# Patient Record
Sex: FEMALE | Race: WHITE | Employment: PART TIME | URBAN - METROPOLITAN AREA
[De-identification: names, ages, dates, MRNs, and addresses within clinical notes are randomized per-mention and may not be internally consistent; named-entity substitution may affect disease eponyms.]

---

## 2018-06-18 ENCOUNTER — OFFICE VISIT (OUTPATIENT)
Dept: FAMILY MEDICINE CLINIC | Age: 18
End: 2018-06-18
Payer: COMMERCIAL

## 2018-06-18 VITALS
TEMPERATURE: 98.4 F | WEIGHT: 109 LBS | HEART RATE: 66 BPM | OXYGEN SATURATION: 98 % | HEIGHT: 64 IN | SYSTOLIC BLOOD PRESSURE: 112 MMHG | DIASTOLIC BLOOD PRESSURE: 70 MMHG | BODY MASS INDEX: 18.61 KG/M2

## 2018-06-18 DIAGNOSIS — Z13.31 POSITIVE DEPRESSION SCREENING: ICD-10-CM

## 2018-06-18 DIAGNOSIS — F32.1 MODERATE MAJOR DEPRESSION (HCC): ICD-10-CM

## 2018-06-18 DIAGNOSIS — Z00.00 ANNUAL PHYSICAL EXAM: Primary | ICD-10-CM

## 2018-06-18 PROCEDURE — G8420 CALC BMI NORM PARAMETERS: HCPCS | Performed by: NURSE PRACTITIONER

## 2018-06-18 PROCEDURE — G8431 POS CLIN DEPRES SCRN F/U DOC: HCPCS | Performed by: NURSE PRACTITIONER

## 2018-06-18 PROCEDURE — 1036F TOBACCO NON-USER: CPT | Performed by: NURSE PRACTITIONER

## 2018-06-18 PROCEDURE — G8427 DOCREV CUR MEDS BY ELIG CLIN: HCPCS | Performed by: NURSE PRACTITIONER

## 2018-06-18 PROCEDURE — 99385 PREV VISIT NEW AGE 18-39: CPT | Performed by: NURSE PRACTITIONER

## 2018-06-18 RX ORDER — ESCITALOPRAM OXALATE 10 MG/1
10 TABLET ORAL DAILY
Qty: 30 TABLET | Refills: 0 | Status: SHIPPED | OUTPATIENT
Start: 2018-06-18 | End: 2018-08-31

## 2018-06-18 RX ORDER — IBUPROFEN 400 MG/1
400 TABLET ORAL EVERY 6 HOURS PRN
COMMUNITY
End: 2018-07-16 | Stop reason: DRUGHIGH

## 2018-06-18 ASSESSMENT — PATIENT HEALTH QUESTIONNAIRE - PHQ9
10. IF YOU CHECKED OFF ANY PROBLEMS, HOW DIFFICULT HAVE THESE PROBLEMS MADE IT FOR YOU TO DO YOUR WORK, TAKE CARE OF THINGS AT HOME, OR GET ALONG WITH OTHER PEOPLE: 1
SUM OF ALL RESPONSES TO PHQ9 QUESTIONS 1 & 2: 6
1. LITTLE INTEREST OR PLEASURE IN DOING THINGS: 3
7. TROUBLE CONCENTRATING ON THINGS, SUCH AS READING THE NEWSPAPER OR WATCHING TELEVISION: 3
6. FEELING BAD ABOUT YOURSELF - OR THAT YOU ARE A FAILURE OR HAVE LET YOURSELF OR YOUR FAMILY DOWN: 0
3. TROUBLE FALLING OR STAYING ASLEEP: 3
5. POOR APPETITE OR OVEREATING: 3
8. MOVING OR SPEAKING SO SLOWLY THAT OTHER PEOPLE COULD HAVE NOTICED. OR THE OPPOSITE, BEING SO FIGETY OR RESTLESS THAT YOU HAVE BEEN MOVING AROUND A LOT MORE THAN USUAL: 3
SUM OF ALL RESPONSES TO PHQ QUESTIONS 1-9: 21
4. FEELING TIRED OR HAVING LITTLE ENERGY: 3
9. THOUGHTS THAT YOU WOULD BE BETTER OFF DEAD, OR OF HURTING YOURSELF: 0
2. FEELING DOWN, DEPRESSED OR HOPELESS: 3

## 2018-06-18 ASSESSMENT — ENCOUNTER SYMPTOMS
CONSTIPATION: 0
COUGH: 0
WHEEZING: 0
DOUBLE VISION: 0
SHORTNESS OF BREATH: 0
DIARRHEA: 0
VOMITING: 0
BLURRED VISION: 0
NAUSEA: 0

## 2018-07-16 ENCOUNTER — OFFICE VISIT (OUTPATIENT)
Dept: FAMILY MEDICINE CLINIC | Age: 18
End: 2018-07-16
Payer: COMMERCIAL

## 2018-07-16 VITALS
TEMPERATURE: 98.2 F | SYSTOLIC BLOOD PRESSURE: 114 MMHG | WEIGHT: 108.5 LBS | BODY MASS INDEX: 18.52 KG/M2 | DIASTOLIC BLOOD PRESSURE: 60 MMHG | HEART RATE: 75 BPM | HEIGHT: 64 IN

## 2018-07-16 DIAGNOSIS — M54.50 CHRONIC MIDLINE LOW BACK PAIN WITHOUT SCIATICA: ICD-10-CM

## 2018-07-16 DIAGNOSIS — S16.1XXA STRAIN OF NECK MUSCLE, INITIAL ENCOUNTER: ICD-10-CM

## 2018-07-16 DIAGNOSIS — G89.29 CHRONIC MIDLINE LOW BACK PAIN WITHOUT SCIATICA: ICD-10-CM

## 2018-07-16 DIAGNOSIS — F32.A DEPRESSION, UNSPECIFIED DEPRESSION TYPE: Primary | ICD-10-CM

## 2018-07-16 PROCEDURE — G8427 DOCREV CUR MEDS BY ELIG CLIN: HCPCS | Performed by: NURSE PRACTITIONER

## 2018-07-16 PROCEDURE — 99214 OFFICE O/P EST MOD 30 MIN: CPT | Performed by: NURSE PRACTITIONER

## 2018-07-16 PROCEDURE — G8420 CALC BMI NORM PARAMETERS: HCPCS | Performed by: NURSE PRACTITIONER

## 2018-07-16 PROCEDURE — 1036F TOBACCO NON-USER: CPT | Performed by: NURSE PRACTITIONER

## 2018-07-16 RX ORDER — IBUPROFEN 600 MG/1
600 TABLET ORAL EVERY 8 HOURS PRN
Qty: 90 TABLET | Refills: 0 | Status: SHIPPED | OUTPATIENT
Start: 2018-07-16 | End: 2018-10-06 | Stop reason: SDUPTHER

## 2018-07-16 RX ORDER — ETONOGESTREL AND ETHINYL ESTRADIOL 11.7; 2.7 MG/1; MG/1
INSERT, EXTENDED RELEASE VAGINAL
Qty: 3 EACH | Refills: 3 | Status: SHIPPED | OUTPATIENT
Start: 2018-07-16 | End: 2019-06-10 | Stop reason: SDUPTHER

## 2018-07-16 ASSESSMENT — ENCOUNTER SYMPTOMS
COUGH: 0
DIARRHEA: 0
DOUBLE VISION: 0
CONSTIPATION: 0
VOMITING: 0
NAUSEA: 0
BACK PAIN: 1
SHORTNESS OF BREATH: 0
BLURRED VISION: 0
WHEEZING: 0

## 2018-07-16 NOTE — PROGRESS NOTES
Chief Complaint   Patient presents with   Mikki Cuenca     pt has yet to start [facility] Maintenance     Reviewed;       HPI:  Patient presents today for  2 week follow up of lexapro. She has not picked her script up from the pharmacy. She reports that her mom does not think that she doesn't need the medication, so she has been hesitant to try it. She has not yet contacted [facility] at this time. She just feels very overwhelmed at this time. Reports that her low back is still bothering her. She also reports that her neck has been stiff and causing her to have headaches. She has been occasionally taking 400 mg ibuprofen with relief of pain. She is a  at [employer] and carries heavy food trays. She knows that she does not always use proper body mechanics. She has also been thinking about birth control. She is complaining that her periods are very heavy and she gets bad cramping. She has not had a PAp to date. Asking about nuva ring. Does not like taking pills. Has been on the patch in the past and has done ok, but does not like to have to worry about something on her skin. Is hesitant about the depot shot. Patient has not had a PAP. Prior to Visit Medications    Medication Sig Taking? Authorizing Provider   ibuprofen (ADVIL;MOTRIN) 400 MG tablet Take 400 mg by mouth every 6 hours as needed for Pain (back pain)  Historical Provider, MD   escitalopram (LEXAPRO) 10 MG tablet Take 1 tablet by mouth daily  Osei Jordan, APRN - CNP         No Known Allergies      Review of Systems  Review of Systems   Constitutional: Negative for chills, fever and malaise/fatigue. HENT: Negative for congestion and nosebleeds. Eyes: Negative for blurred vision and double vision. Respiratory: Negative for cough, shortness of breath and wheezing. Cardiovascular: Negative for chest pain, palpitations and leg swelling.    Gastrointestinal: Negative for constipation, diarrhea, nausea and vomiting. Genitourinary: Negative for dysuria and urgency. Musculoskeletal: Positive for back pain, myalgias and neck pain. Negative for joint pain. Neurological: Negative for dizziness and headaches. Psychiatric/Behavioral: Positive for depression. The patient is nervous/anxious. VS:  /60   Pulse 75   Temp 98.2 °F (36.8 °C) (Oral)   Ht 5' 4\" (1.626 m)   Wt 108 lb 8 oz (49.2 kg)   LMP 06/17/2018   Breastfeeding? No   BMI 18.62 kg/m²     Patient's medical, social, and family history reviewed      Physical Exam  Physical Exam   Constitutional: She is oriented to person, place, and time. She appears well-developed and well-nourished. HENT:   Head: Normocephalic. Eyes: Pupils are equal, round, and reactive to light. Neck: Normal range of motion. Neck supple. No thyromegaly present. Cardiovascular: Normal rate and regular rhythm. Pulmonary/Chest: Effort normal and breath sounds normal.   Abdominal: Soft. Bowel sounds are normal.   Musculoskeletal: Normal range of motion. Cervical back: She exhibits tenderness. Thoracic back: She exhibits tenderness and pain. She exhibits normal range of motion. Lymphadenopathy:     She has no cervical adenopathy. Neurological: She is alert and oriented to person, place, and time. Skin: Skin is warm and dry. Psychiatric: She has a normal mood and affect. Her behavior is normal.         Assessment/Plan:    1. Depression, unspecified depression type  Patient to initiate lexapro and contact Counseling center    2. Chronic midline low back pain without sciatica  NSAIDS as needed  Heat to back 3 x day for 20 min   Low back stretches  - [facility]    3. Strain of neck muscle, initial encounter  - [facility]    Return in about 6 weeks (around 8/27/2018) for low back pain, anxiety, Depression follow up.       PILI Gonzalez - CNP

## 2018-07-31 ENCOUNTER — EVALUATION (OUTPATIENT)
Dept: PHYSICAL THERAPY | Age: 18
End: 2018-07-31
Payer: COMMERCIAL

## 2018-07-31 DIAGNOSIS — G89.29 CHRONIC MIDLINE LOW BACK PAIN WITHOUT SCIATICA: Primary | ICD-10-CM

## 2018-07-31 DIAGNOSIS — M54.50 CHRONIC MIDLINE LOW BACK PAIN WITHOUT SCIATICA: Primary | ICD-10-CM

## 2018-07-31 PROCEDURE — 97163 PT EVAL HIGH COMPLEX 45 MIN: CPT | Performed by: PHYSICAL THERAPIST

## 2018-07-31 PROCEDURE — G8981 BODY POS CURRENT STATUS: HCPCS | Performed by: PHYSICAL THERAPIST

## 2018-07-31 PROCEDURE — G8982 BODY POS GOAL STATUS: HCPCS | Performed by: PHYSICAL THERAPIST

## 2018-07-31 NOTE — PROGRESS NOTES
[facility] OUTPATIENT REHABILITATION  PHYSICAL THERAPY INITIAL EVALUATION         Date:  2018   Patient: Rosie Perez  : 2000  MRN: [id number]  Referring Provider: PILI Bettencourt - CNP  [address], [place] [address]     Medical Diagnosis:   M54.5, G89.29 (ICD-10-CM) - Chronic midline low back pain without sciatica   S16. 1XXA (ICD-10-CM) - Strain of neck muscle, initial encounter       Onset date:     Onset[de-identified] Insidious onset    Chief complaint: pain in back     SUBJECTIVE:     Pat Medical History  Past Medical History:   Diagnosis Date    Anxiety     Depression      No past surgical history on file. Medications:   Current Outpatient Prescriptions   Medication Sig Dispense Refill    ibuprofen (ADVIL;MOTRIN) 600 MG tablet Take 1 tablet by mouth every 8 hours as needed for Pain 90 tablet 0    etonogestrel-ethinyl estradiol (NUVARING) 0.12-0.015 MG/24HR vaginal ring Use as directed. Remove every 21 days for 7 days 3 each 3    escitalopram (LEXAPRO) 10 MG tablet Take 1 tablet by mouth daily 30 tablet 0     No current facility-administered medications for this visit. Imaging results: No results found.     Pain: intermitttent  Current: 4/10     Best: 0/10     Worst:9/10    Symptom Type/Quality: aching  Location[de-identified] Back: parapsinals from T6 to L5     Behavior: condition is staying the same      Provoking Activities/Positions:  [] Sitting  [x] Standing  [x] Walking                                                                     [] Lying  [] Bending  [] When still                                                                     [] On the move  [] Turning head  [] A.M.                                                                     [] P.M.  [] As day progresses [] Cough                [] Sneezing                 Relieving Activitie/Positions:      [] Sitting  [] Standing  [] Walking [x] Lying flat back  [] Bending  [] When still                                                                     [] On the move  [] Turning head  [] A.M.                                                                     [] P.M.  [] As day progresses [] Cough                [] Sneezing     Disturbed Sleep:  Yes []    No []  Bladder Dysfunction:  Yes []    No []  Bowel Dysfunction:     Yes[]     No  []       Occupation: works at [employer] at [employer]. Status: Full Time    Exercise regimen: none    Patient Goals: Pain control    Medical Management for Current Problem:  [] Chiro  []  CT  []  Injection:    []  Meds:   []  MRI:  []  Ortho  []  PCP  []  PM&R  []  PT/OT  [x]  X-ray:  []  Other:     Contraindications/Precautions: none    OBJECTIVE:     Inspection:  Standing    Cervical: Forward Head   [x] Normal   []Mild   [] Moderate   []Severe      Thoracic:         [x] Normal   [] Increased Kyphosis  [] Decreased Kyphosis    Lumbar:   [x] Normal   [] Increased Lordosis   [] Decreased Lordosis           Observations: well nourished female with normal affect     Gait:  Normal    Functional Strength:   Able to toe walk, heel walk, and squat. Range of Motion:    Trunk:   Flexion:  [] Normal   [x] Limited pain paraspinals   Extension:  [x] Normal   [] Limited     Right Rotation: [x] Normal   [] Limited    Left Rotation:  [x] Normal   [] Limited    Right Side Bending: [x] Normal   [] Limited L lumbar pain    Left Side Bending: [x] Normal   [] Limited R lumbar pain    Lower Extremity:   Right:   [x] Normal   [] Limited    Left:   [] Normal   [] Limited       Strength:     Trunk: 4+/5   R LE: 5/5   L LE: 5/5     Palpation: Tender to palpation all along paraspinals, decreased joint motion w/ PA force through spinous processes    Sensation: intact to light touch and temperature.     Special Tests:   [x] Nerve Root Compression           Right []+ / [x] -    Left []+ / [x] -  [x] Slump           Right []+ / [x] -

## 2018-08-03 ENCOUNTER — TREATMENT (OUTPATIENT)
Dept: PHYSICAL THERAPY | Age: 18
End: 2018-08-03
Payer: COMMERCIAL

## 2018-08-03 DIAGNOSIS — M54.50 CHRONIC MIDLINE LOW BACK PAIN WITHOUT SCIATICA: Primary | ICD-10-CM

## 2018-08-03 DIAGNOSIS — G89.29 CHRONIC MIDLINE LOW BACK PAIN WITHOUT SCIATICA: Primary | ICD-10-CM

## 2018-08-03 PROCEDURE — G0283 ELEC STIM OTHER THAN WOUND: HCPCS | Performed by: PHYSICAL THERAPIST

## 2018-08-03 PROCEDURE — 97110 THERAPEUTIC EXERCISES: CPT | Performed by: PHYSICAL THERAPIST

## 2018-08-09 ENCOUNTER — TREATMENT (OUTPATIENT)
Dept: PHYSICAL THERAPY | Age: 18
End: 2018-08-09
Payer: COMMERCIAL

## 2018-08-09 DIAGNOSIS — M54.50 CHRONIC MIDLINE LOW BACK PAIN WITHOUT SCIATICA: Primary | ICD-10-CM

## 2018-08-09 DIAGNOSIS — G89.29 CHRONIC MIDLINE LOW BACK PAIN WITHOUT SCIATICA: Primary | ICD-10-CM

## 2018-08-09 PROCEDURE — 97110 THERAPEUTIC EXERCISES: CPT | Performed by: PHYSICAL THERAPIST

## 2018-08-09 NOTE — PROGRESS NOTES
Physical Therapy Treatment Note    Date: 2018  Patient Name: Adina Mendez  : 2000   MRN: [id number]  Referring Provider: Sharifa Jung, PILI - CNP  [address], [place] [address]                             Medical Diagnosis:   M54.5, G89.29 (ICD-10-CM) - Chronic midline low back pain without sciatica   S16. 1XXA (ICD-10-CM) - Strain of neck muscle, initial encounter   Onset date:    Onset[de-identified] Insidious onset   Chief complaint: pain in back    S: reports her pain is sore from working  O:   Time [id number]     Visit 3     Pain 7/10     ROM      Modalities      MH  Prone x 15 min           Exercise      ALL EXERCISE DONE WITH DRAW-IN TECHNIQUE                            t     ROWS: H Blue 2 x 15  te   ROWS: M Blue 2 x 15  te   ROWS: L Blue 2 x 15  te   Obliques - high      Obliques - low       THEREX     Nustep        Punches      Seated trunk rotation stretch 2 x 20 sec  te   Cat camel stretch 20 reps  te   Seated low back flex stretch 2 x 20 sec  te         Trunk ext TB      Trunk flex TB      Hip abd      Hip EXT      TG Squats                  A:  Tolerated well. Above added to written HEP.   P: Continue with rehab plan  Akiko Basilio PT    Treatment Charges: Mins Units   Initial Evaluation     Re-Evaluation     Ther Exercise         TE 25 2   Manual Therapy     MT     Ther Activities        TA     Gait Training          GT     Neuro Re-education NR     Modalities estim 15 1   Non-Billable Service Time     Other     Total Time/Units 40 2

## 2018-08-31 ENCOUNTER — OFFICE VISIT (OUTPATIENT)
Dept: FAMILY MEDICINE CLINIC | Age: 18
End: 2018-08-31
Payer: COMMERCIAL

## 2018-08-31 VITALS
BODY MASS INDEX: 21.48 KG/M2 | DIASTOLIC BLOOD PRESSURE: 60 MMHG | WEIGHT: 109.4 LBS | RESPIRATION RATE: 16 BRPM | HEART RATE: 105 BPM | TEMPERATURE: 98.5 F | HEIGHT: 60 IN | SYSTOLIC BLOOD PRESSURE: 100 MMHG | OXYGEN SATURATION: 98 %

## 2018-08-31 DIAGNOSIS — M54.2 CERVICAL PAIN (NECK): Primary | ICD-10-CM

## 2018-08-31 DIAGNOSIS — F32.1 MODERATE MAJOR DEPRESSION (HCC): ICD-10-CM

## 2018-08-31 PROCEDURE — G8420 CALC BMI NORM PARAMETERS: HCPCS | Performed by: NURSE PRACTITIONER

## 2018-08-31 PROCEDURE — 99214 OFFICE O/P EST MOD 30 MIN: CPT | Performed by: NURSE PRACTITIONER

## 2018-08-31 PROCEDURE — 1036F TOBACCO NON-USER: CPT | Performed by: NURSE PRACTITIONER

## 2018-08-31 PROCEDURE — G8427 DOCREV CUR MEDS BY ELIG CLIN: HCPCS | Performed by: NURSE PRACTITIONER

## 2018-08-31 RX ORDER — ESCITALOPRAM OXALATE 20 MG/1
20 TABLET ORAL DAILY
Qty: 30 TABLET | Refills: 3 | Status: SHIPPED | OUTPATIENT
Start: 2018-08-31 | End: 2019-05-01

## 2018-08-31 RX ORDER — ESCITALOPRAM OXALATE 10 MG/1
10 TABLET ORAL DAILY
Qty: 30 TABLET | Refills: 0 | Status: CANCELLED | OUTPATIENT
Start: 2018-08-31

## 2018-08-31 ASSESSMENT — ENCOUNTER SYMPTOMS
BLURRED VISION: 0
COUGH: 0
VOMITING: 0
NAUSEA: 0
WHEEZING: 0
DIARRHEA: 0
DOUBLE VISION: 0
SHORTNESS OF BREATH: 0
BACK PAIN: 1
CONSTIPATION: 0

## 2018-09-28 DIAGNOSIS — M54.50 LOW BACK PAIN POTENTIALLY ASSOCIATED WITH RADICULOPATHY: Primary | ICD-10-CM

## 2018-10-01 RX ORDER — NORGESTIMATE AND ETHINYL ESTRADIOL 0.25-0.035
1 KIT ORAL DAILY
COMMUNITY
End: 2019-05-01

## 2018-10-06 DIAGNOSIS — G89.29 CHRONIC MIDLINE LOW BACK PAIN WITHOUT SCIATICA: ICD-10-CM

## 2018-10-06 DIAGNOSIS — S16.1XXA STRAIN OF NECK MUSCLE, INITIAL ENCOUNTER: ICD-10-CM

## 2018-10-06 DIAGNOSIS — M54.50 CHRONIC MIDLINE LOW BACK PAIN WITHOUT SCIATICA: ICD-10-CM

## 2018-10-08 RX ORDER — IBUPROFEN 600 MG/1
600 TABLET ORAL EVERY 8 HOURS PRN
Qty: 90 TABLET | Refills: 0 | Status: SHIPPED
Start: 2018-10-08 | End: 2020-07-10

## 2019-05-01 ENCOUNTER — OFFICE VISIT (OUTPATIENT)
Dept: FAMILY MEDICINE CLINIC | Age: 19
End: 2019-05-01
Payer: COMMERCIAL

## 2019-05-01 VITALS
HEIGHT: 63 IN | WEIGHT: 101 LBS | HEART RATE: 89 BPM | BODY MASS INDEX: 17.89 KG/M2 | SYSTOLIC BLOOD PRESSURE: 92 MMHG | OXYGEN SATURATION: 98 % | TEMPERATURE: 98.8 F | DIASTOLIC BLOOD PRESSURE: 60 MMHG

## 2019-05-01 DIAGNOSIS — Z13.220 SCREENING CHOLESTEROL LEVEL: ICD-10-CM

## 2019-05-01 DIAGNOSIS — B36.0 TINEA VERSICOLOR: ICD-10-CM

## 2019-05-01 DIAGNOSIS — F32.A DEPRESSION, UNSPECIFIED DEPRESSION TYPE: ICD-10-CM

## 2019-05-01 DIAGNOSIS — Z00.00 PREVENTATIVE HEALTH CARE: ICD-10-CM

## 2019-05-01 DIAGNOSIS — R53.83 FATIGUE, UNSPECIFIED TYPE: ICD-10-CM

## 2019-05-01 DIAGNOSIS — R00.0 TACHYCARDIA: ICD-10-CM

## 2019-05-01 DIAGNOSIS — R23.1 SKIN PALLOR: ICD-10-CM

## 2019-05-01 DIAGNOSIS — Z13.31 POSITIVE DEPRESSION SCREENING: ICD-10-CM

## 2019-05-01 DIAGNOSIS — Z30.09 BIRTH CONTROL COUNSELING: ICD-10-CM

## 2019-05-01 DIAGNOSIS — E55.9 VITAMIN D DEFICIENCY: ICD-10-CM

## 2019-05-01 PROBLEM — F90.9 ADHD: Status: RESOLVED | Noted: 2019-05-01 | Resolved: 2019-05-01

## 2019-05-01 PROCEDURE — G8431 POS CLIN DEPRES SCRN F/U DOC: HCPCS | Performed by: NURSE PRACTITIONER

## 2019-05-01 PROCEDURE — 99214 OFFICE O/P EST MOD 30 MIN: CPT | Performed by: NURSE PRACTITIONER

## 2019-05-01 PROCEDURE — 96160 PT-FOCUSED HLTH RISK ASSMT: CPT | Performed by: NURSE PRACTITIONER

## 2019-05-01 ASSESSMENT — PATIENT HEALTH QUESTIONNAIRE - PHQ9
SUM OF ALL RESPONSES TO PHQ9 QUESTIONS 1 & 2: 5
5. POOR APPETITE OR OVEREATING: 1
SUM OF ALL RESPONSES TO PHQ QUESTIONS 1-9: 17
3. TROUBLE FALLING OR STAYING ASLEEP: 1
9. THOUGHTS THAT YOU WOULD BE BETTER OFF DEAD, OR OF HURTING YOURSELF: 1
SUM OF ALL RESPONSES TO PHQ QUESTIONS 1-9: 17
2. FEELING DOWN, DEPRESSED OR HOPELESS: 3
DEPRESSION UNABLE TO ASSESS: FUNCTIONAL CAPACITY MOTIVATION LIMITS ACCURACY
10. IF YOU CHECKED OFF ANY PROBLEMS, HOW DIFFICULT HAVE THESE PROBLEMS MADE IT FOR YOU TO DO YOUR WORK, TAKE CARE OF THINGS AT HOME, OR GET ALONG WITH OTHER PEOPLE: 3
7. TROUBLE CONCENTRATING ON THINGS, SUCH AS READING THE NEWSPAPER OR WATCHING TELEVISION: 3
1. LITTLE INTEREST OR PLEASURE IN DOING THINGS: 2
8. MOVING OR SPEAKING SO SLOWLY THAT OTHER PEOPLE COULD HAVE NOTICED. OR THE OPPOSITE, BEING SO FIGETY OR RESTLESS THAT YOU HAVE BEEN MOVING AROUND A LOT MORE THAN USUAL: 2
6. FEELING BAD ABOUT YOURSELF - OR THAT YOU ARE A FAILURE OR HAVE LET YOURSELF OR YOUR FAMILY DOWN: 2
4. FEELING TIRED OR HAVING LITTLE ENERGY: 2

## 2019-05-01 ASSESSMENT — ENCOUNTER SYMPTOMS
SHORTNESS OF BREATH: 0
SINUS PAIN: 0
DIARRHEA: 0
BACK PAIN: 1
EYE PAIN: 0
COUGH: 0
VOMITING: 0
CONSTIPATION: 0
CHEST TIGHTNESS: 0
NAUSEA: 0
COLOR CHANGE: 1
WHEEZING: 0

## 2019-05-01 NOTE — PROGRESS NOTES
HPI:  The patient is a 19 y.o. female who presents today to establish care, discuss birth control and depression. Ana Paula Wilder has had a eye exam last year. She is requesting a birth control that doesn't make her have periods. She has very painful periods and is not interested in the Depo Shot, nor taking a daily pill. She is hoping to try something that does not involve hormones since she believes they may be contributing to her depressive symptoms lately. She has never seen a gynecologist, nor has had a pap test, she is currently sexually active with 1 partner and has not been using her birth control for some time. 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 She is able to manage her pain with stretching and physical therapy. She has been managing her back pain with motrin, but is questioning whether or not that is working for her. Ana Paula Wilder has been feeling depressed lately, she finds herself more agitated with people at work and gets anxious if she is going into public places. She reports her mother suffers from anxiety and has a pill to take if she is really nervous. She does not have any thoughts of harm to self or others. Ivett's sleeping habits, she prefers a dark room, and sleeps around 6-7 hours of sleep a night. She starts work at 5 am at [employer]. Past Medical History:   Diagnosis Date    ADHD     Anxiety     Depression       No past surgical history on file.    Family History   Problem Relation Age of Onset    Cancer Maternal Grandfather     Cancer Paternal Grandmother      Social History     Socioeconomic History    Marital status: Single     Spouse name: Not on file    Number of children: Not on file    Years of education: Not on file    Highest education level: Not on file   Occupational History    Not on file   Social Needs    Financial resource strain: Not on file    Food insecurity:     Worry: Not on file     Inability: Not on file    Transportation needs:     Medical: Not on file     Non-medical: Not on file   Tobacco Use    Smoking status: Former Smoker    Smokeless tobacco: Never Used   Substance and Sexual Activity    Alcohol use: No    Drug use: Yes     Types: Marijuana     Comment: occasional    Sexual activity: Yes     Partners: Male   Lifestyle    Physical activity:     Days per week: Not on file     Minutes per session: Not on file    Stress: Not on file   Relationships    Social connections:     Talks on phone: Not on file     Gets together: Not on file     Attends Voodoo service: Not on file     Active member of club or organization: Not on file     Attends meetings of clubs or organizations: Not on file     Relationship status: Not on file    Intimate partner violence:     Fear of current or ex partner: Not on file     Emotionally abused: Not on file     Physically abused: Not on file     Forced sexual activity: Not on file   Other Topics Concern    Not on file   Social History Narrative    Not on file      No Known Allergies     Prior to Visit Medications    Medication Sig Taking? Authorizing Provider   Pyrithione Zinc (DHS ZINC) 2 % SHAM Apply 1 Bottle topically twice a week Wash affected areas in place of regular soap at least twice weekly. Yes PILI Gonzalez CNP   ibuprofen (ADVIL;MOTRIN) 600 MG tablet TAKE 1 TABLET BY MOUTH EVERY 8 HOURS AS NEEDED FOR PAIN Yes PILI Yeboah CNP   etonogestrel-ethinyl estradiol (NUVARING) 0.12-0.015 MG/24HR vaginal ring Use as directed. Remove every 21 days for 7 days Yes PILI Yeboah CNP       Review of Systems:    Review of Systems   Constitutional: Positive for activity change (Started working at [employer]), appetite change (Decreased) and unexpected weight change (7 lb weight loss within 6 mths). Negative for chills and fever.    HENT: Negative for congestion, ear pain, sinus pain and sneezing. Eyes: Negative for pain and visual disturbance. Respiratory: Negative for cough, chest tightness, shortness of breath and wheezing. Cardiovascular: Negative for chest pain, palpitations and leg swelling. Gastrointestinal: Negative for constipation, diarrhea, nausea and vomiting. Endocrine: Negative for cold intolerance, heat intolerance and polyuria. Genitourinary: Negative for difficulty urinating. Musculoskeletal: Positive for back pain (chronic back pain, hip, cervical pain). Negative for arthralgias and myalgias. Skin: Positive for color change and rash. Small [place] rash that appears at 1-3 a times, she believes as a youth she had a skin condition because she applied a cream for it. Neurological: Positive for dizziness (Orthostatic hypertension), light-headedness and headaches (Mother has migraines, 2-3 times/month gets headache). Hematological: Negative for adenopathy. Does not bruise/bleed easily (At times). Psychiatric/Behavioral: Positive for agitation (Life stressors) and decreased concentration. Negative for sleep disturbance and suicidal ideas. The patient is nervous/anxious [place] places, unknown people, family). BP Readings from Last 1 Encounters:   05/01/19 92/60    Recheck if >140/90  No results found for: LABA1C  No results found for: LABMICR    Have you had your Pneumonia Vaccine N/A    Have you had a Colorectal Screening  N/A    Have you had a Screening Mammogram  N/A    Have you seen any other physician or provider since your last visit no    Have you had any other diagnostic tests since your last visit? no    Physical Exam:    Vitals:    05/01/19 1625   BP: 92/60   Pulse: 89   Temp: 98.8 °F (37.1 °C)   TempSrc: Oral   SpO2: 98%   Weight: 101 lb (45.8 kg)   Height: 5' 3\" (1.6 m)     Physical Exam   Constitutional: She is oriented to person, place, and time. She appears well-developed and well-nourished.    HENT:   Head: Normocephalic and atraumatic. Eyes: Pupils are equal, round, and reactive to light. EOM are normal.   Neck: Normal range of motion. Neck supple. No thyromegaly present. Cardiovascular: Normal rate, regular rhythm, normal heart sounds and intact distal pulses. Pulmonary/Chest: Effort normal and breath sounds normal.   Abdominal: Soft. Bowel sounds are normal.   Genitourinary:   Genitourinary Comments: Deferred. Musculoskeletal: Normal range of motion. Lymphadenopathy:     She has no cervical adenopathy. Neurological: She is alert and oriented to person, place, and time. No cranial nerve deficit. CN II to XII intact, gait normal. Stregth 5/5 bilateral upper & lower extremities. Skin: Skin is warm and dry. Capillary refill takes less than 2 seconds. Rash noted. 1 cm circumferential hypopigmented plaque on abdomen present during examination. No discharge, edema or erythema. Psychiatric: She has a normal mood and affect. Judgment normal.   Nursing note and vitals reviewed. Assessment/Plan:    Amaya was seen today for depression and contraception. Diagnoses and all orders for this visit:    Depression, unspecified depression type   -Amaya will begin to journal her feelings and bring with her to the next appointment   -Pt will notify office of previous counselor's name for office to place for referral for her.   -Ordered lab work and will re-evaluate following results and formulate a treatment plan with patient at 1 mth f/u visit    Vitamin D deficiency    Positive depression screening  -     TSH without Reflex; Future  -     T4, Free; Future  -     Positive Screen for Clinical Depression with a Documented Follow-up Plan     On the basis of positive PHQ-9 screening (PHQ-9 Total Score: 17), the following plan was implemented: referral for psychotherapy will be provided to address external stressors was discussed at today's visit. Patient is willing to begin counseling.    Patient will follow-up in 7 day(s) with personal recommendation for psychotherapist.    Screening Cholesterol level  -     Lipid Panel; Future    Fatigue, unspecified type  -     Vitamin D 25 Hydroxy; Future    Skin pallor  -     CBC Auto Differential; Future    Tachycardia  -     CBC Auto Differential; Future  -     TSH without Reflex; Future  -     T4, Free; Future    Preventative health care  -     Comprehensive Metabolic Panel; Future    Tinea versicolor   -Pyrithione Zinc (DHS Zinc) 2% shampoo, used in place of soap at least 2x/week, PRN    Birth control counseling   -Discussed birth control options available at this time, [facility] at [facility].   -Will refer to gynecologist for care based on Pt's location/ preference      Return in about 1 month (around 5/29/2019) for test results depression screen. , or sooner if necessary.

## 2019-05-03 ENCOUNTER — TELEPHONE (OUTPATIENT)
Dept: FAMILY MEDICINE CLINIC | Age: 19
End: 2019-05-03

## 2019-05-03 NOTE — TELEPHONE ENCOUNTER
Received message from  [facility] office that they do not prescribe BC. RAVEN Gimenez to contact Dr. Delma Regalado office for appointment and I could refer if necessary.

## 2019-06-07 ENCOUNTER — OFFICE VISIT (OUTPATIENT)
Dept: FAMILY MEDICINE CLINIC | Age: 19
End: 2019-06-07
Payer: COMMERCIAL

## 2019-06-07 VITALS
BODY MASS INDEX: 17.07 KG/M2 | DIASTOLIC BLOOD PRESSURE: 60 MMHG | HEIGHT: 64 IN | HEART RATE: 57 BPM | WEIGHT: 100 LBS | TEMPERATURE: 98.2 F | SYSTOLIC BLOOD PRESSURE: 98 MMHG | OXYGEN SATURATION: 97 %

## 2019-06-07 DIAGNOSIS — F32.A DEPRESSION, UNSPECIFIED DEPRESSION TYPE: ICD-10-CM

## 2019-06-07 DIAGNOSIS — Z30.09 BIRTH CONTROL COUNSELING: Primary | ICD-10-CM

## 2019-06-07 DIAGNOSIS — Z32.00 ENCOUNTER FOR PREGNANCY TEST, RESULT UNKNOWN: ICD-10-CM

## 2019-06-07 DIAGNOSIS — F41.9 ANXIETY: ICD-10-CM

## 2019-06-07 LAB
CONTROL: NORMAL
PREGNANCY TEST URINE, POC: NORMAL

## 2019-06-07 PROCEDURE — 99213 OFFICE O/P EST LOW 20 MIN: CPT | Performed by: NURSE PRACTITIONER

## 2019-06-07 PROCEDURE — 81025 URINE PREGNANCY TEST: CPT | Performed by: NURSE PRACTITIONER

## 2019-06-07 NOTE — PROGRESS NOTES
HPI: Patient comes to the office today for a follow up to discuss her lab work, depression/anxiety and request for birth control. Vj Sterling has been using a journal for her feelings, has not identified any particular triggers, believes that she still is not feeling quite herself. She eludes that is hard to speak to her parents about things sometimes,which was the problem with the last counselor who advised they have a family session. She is not against speaking to someone, at this point she would rather try therapy instead of taking medication. She has reached out and learned her last therapist is no longer working in the area. She is sexually involved with one male partner who is active duty [employer], home for leave the past month. She is currently partaking in un-protective sex at the time, she desires birthcontrol refill today. She does not want to get pregnant, but believes this man is the one she wants to  in the future. Has not made contact with Dr Karen Romo we referred her to, nor did she complete her lab work at the last appointment. She will do so soon. Chief Complaint   Patient presents with    Follow-up     Discuss results   . Prior to Visit Medications    Medication Sig Taking? Authorizing Provider   Pyrithione Zinc (DHS ZINC) 2 % SHAM Apply 1 Bottle topically twice a week Wash affected areas in place of regular soap at least twice weekly. Yes Dora Must, APRN - CNP   ibuprofen (ADVIL;MOTRIN) 600 MG tablet TAKE 1 TABLET BY MOUTH EVERY 8 HOURS AS NEEDED FOR PAIN Yes Toño Case, APRN - CNP   etonogestrel-ethinyl estradiol (NUVARING) 0.12-0.015 MG/24HR vaginal ring Use as directed. Remove every 21 days for 7 days Yes Toño Case, APRN - CNP     No Known Allergies    Review of Systems    Review of Systems   Constitutional: Negative for activity change, appetite change, chills and fever. HENT: Negative for congestion, ear pain, sinus pain and sneezing.     Eyes: Negative for pain and visual disturbance. Respiratory: Negative for cough, chest tightness, shortness of breath and wheezing. Cardiovascular: Negative for chest pain, palpitations and leg swelling. Gastrointestinal: Negative for constipation, diarrhea, nausea and vomiting. Endocrine: Negative for polyuria. Genitourinary: Negative for difficulty urinating. Musculoskeletal: Negative for arthralgias and myalgias. Skin: Negative for color change and rash. Neurological: Negative for dizziness, light-headedness and headaches. Hematological: Negative for adenopathy. Does not bruise/bleed easily. Psychiatric/Behavioral: Positive for agitation. Negative for decreased concentration, sleep disturbance and suicidal ideas. The patient is nervous/anxious. VS:  BP 98/60   Pulse 57   Temp 98.2 °F (36.8 °C) (Oral)   Ht 5' 4\" (1.626 m)   Wt 100 lb (45.4 kg)   LMP 05/10/2019   SpO2 97%   BMI 17.16 kg/m²     Physical Exam    Physical Exam   Constitutional: She is oriented to person, place, and time. She appears well-developed and well-nourished. HENT:   Head: Normocephalic and atraumatic. Eyes: Pupils are equal, round, and reactive to light. Conjunctivae and EOM are normal.   Neck: Normal range of motion. Cardiovascular: Normal rate, regular rhythm, normal heart sounds and intact distal pulses. Pulmonary/Chest: Effort normal and breath sounds normal.   Genitourinary:   Genitourinary Comments: Deferred. Musculoskeletal: Normal range of motion. Neurological: She is alert and oriented to person, place, and time. Skin: Skin is warm and dry. Rash (Previous ringworm rash noted, improving) noted. Psychiatric: She has a normal mood and affect. Her behavior is normal. Judgment and thought content normal.     Health Maintenance    BP Readings from Last 1 Encounters:   06/07/19 98/60    Recheck if >140/90  Ammy was seen today for follow-up.     Diagnoses and all orders for this visit:    Encounter for pregnancy test, result unknown  -     Ammy requests a refill of her nova-ring as she has partook in 1 month of un-protective sex with her partner  - POCT urine pregnancy    Birth control counseling   - Jaylonbecka Farias is aware that Dr Kimberley Posada is unable to prescribe the birth control that Jaylon Farias desires. Yoan Carnes MD, OB/GYN, Jeri Steiner ([facility]) based upon recommendations. Depression, unspecified depression type  - Jaylon Farias has not completed her lab work from last appointment, she will do so prior to referring her to counseling or discussing medication treatment regimens. Cesar Gunter is working full time now and currently visiting with her boyfriend while he is on leave.  She is becoming aware of what triggers her anxiety and learning to manage her response to such    Greater than 15  Minutes was spent with patient and more than 50% of the time was spent face to facecounseling and educating regarding diagnoses  that

## 2019-06-09 PROBLEM — Z32.00 ENCOUNTER FOR PREGNANCY TEST, RESULT UNKNOWN: Status: ACTIVE | Noted: 2019-06-09

## 2019-06-09 PROBLEM — Z30.09 BIRTH CONTROL COUNSELING: Status: ACTIVE | Noted: 2019-06-09

## 2019-06-09 PROBLEM — F32.A DEPRESSION: Status: ACTIVE | Noted: 2019-06-09

## 2019-06-09 PROBLEM — F41.9 ANXIETY: Status: ACTIVE | Noted: 2019-06-09

## 2019-06-09 ASSESSMENT — ENCOUNTER SYMPTOMS
NAUSEA: 0
EYE PAIN: 0
VOMITING: 0
CHEST TIGHTNESS: 0
DIARRHEA: 0
CONSTIPATION: 0
WHEEZING: 0
SHORTNESS OF BREATH: 0
SINUS PAIN: 0
COUGH: 0
COLOR CHANGE: 0

## 2019-06-10 RX ORDER — ETONOGESTREL AND ETHINYL ESTRADIOL 11.7; 2.7 MG/1; MG/1
INSERT, EXTENDED RELEASE VAGINAL
Qty: 3 EACH | Refills: 3 | Status: SHIPPED
Start: 2019-06-10 | End: 2020-07-10

## 2019-06-22 ENCOUNTER — HOSPITAL ENCOUNTER (EMERGENCY)
Age: 19
Discharge: HOME | End: 2019-06-22
Payer: COMMERCIAL

## 2019-06-22 VITALS
SYSTOLIC BLOOD PRESSURE: 120 MMHG | HEART RATE: 50 BPM | OXYGEN SATURATION: 100 % | DIASTOLIC BLOOD PRESSURE: 68 MMHG | TEMPERATURE: 98 F | BODY MASS INDEX: 18.54 KG/M2 | WEIGHT: 108 LBS | RESPIRATION RATE: 14 BRPM
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
## 2019-06-22 PROCEDURE — 99212 OFFICE O/P EST SF 10 MIN: CPT

## 2019-06-22 RX ORDER — METHYLPREDNISOLONE 4 MG/1
TABLET ORAL
Qty: 21 TABLET | Status: SHIPPED | OUTPATIENT
Start: 2019-06-22 | End: 2019-06-28

## 2019-06-22 NOTE — ED PROVIDER NOTES
Department of Emergency Medicine   [facility]  Provider Note  Admit Date/RoomTime: 6/22/2019 11:14 AM  Room: 04/04    Chief Complaint   Insect Bite (pt has several bites on her body. noticed them yesterday morning. c/o itching)    History of Present Illness   Source of history provided by:  patient. History/Exam Limitations: none. Lexx Estevez is a 19 y.o. old female with has a past medical history of:   Past Medical History:   Diagnosis Date    ADHD     Anxiety     Depression     presents to the urgent care center by private vehicle, for complaint of some type of insect bites. She said she woke up like that this morning. She said she has  been using hydrocortisone cream but it  is not helping the itching at all. She said that they are all over her body basically. She did state a different place last night. She went to bed she did not have the bites and she woke up with the bites all over her body. ROS    Pertinent positives and negatives are stated within HPI, all other systems reviewed and are negative. History reviewed. No pertinent surgical history. Social History:  reports that she has quit smoking. She has never used smokeless tobacco. She reports that she has current or past drug history. Drug: Marijuana. She reports that she does not drink alcohol. Family History: family history includes Cancer in her maternal grandfather and paternal grandmother. Allergies: Patient has no known allergies. Physical Exam           ED Triage Vitals [06/22/19 1116]   BP Temp Temp Source Pulse Resp SpO2 Height Weight   120/68 98 °F (36.7 °C) Oral 50 14 100 % -- 108 lb (49 kg)     Oxygen Saturation Interpretation: Normal.    Constitutional:  Alert, development consistent with age. HEENT:  NC/NT. Airway patent. Eyes:  Conjunctiva clear, no discharge. Mouth:  Mucous membranes moist   Neck:  Supple. No lymphadenopathy.   Respiratory:  Clear to auscultation and breath sounds equal.  CV:  Regular rate and rhythm. GI:  Abdomen Soft, nontender, +BS. Integument:  Skin turgor: Normal.              Has large scattered areas that appear to be insect bites all over on her neck arms legs abdomen there is erythema around each individual bite area. .  Neurological:  Orientation age-appropriate unless noted elseware. Motor functions intact. Lab / Imaging Results   (All laboratory and radiology results have been personally reviewed by myself)  Labs:  No results found for this visit on 06/22/19. Imaging: All Radiology results interpreted by Radiologist unless otherwise noted. No orders to display       ED Course / Medical Decision Making   Medications - No data to display         Procedures:   none    MDM:   She slept in a different bed last night and woke up with these bites most likely there are bedbug bites. She said the itching is intense and she cannot tolerate it she said she is tried Benadryl and also cortisone cream.  I did put her on a Medrol Dosepak she was advised and methods to help eradicate bedbugs and to try not to sleep in that same place again. Counseling:    I have  spoken with the patient and discussed todays results, in addition to providing specific details for the plan of care and counseling regarding the diagnosis and prognosis. Questions are answered at this time and they are agreeable with the plan. Assessment      1. Insect bite, unspecified site, initial encounter      Plan   Discharge to home and advised to contact PILI Orellana CNP  [address] [address]  [phone]      As needed   Patient condition is good    New Medications     New Prescriptions    METHYLPREDNISOLONE (MEDROL, LOYDA,) 4 MG TABLET    Take as directed on package insert days 1-6     Electronically signed by PILI Anaya CNP   DD: 6/22/19  **This report was transcribed using voice recognition software.  Every effort was made to ensure accuracy; however, inadvertent computerized transcription errors may be present.   END OF ED PROVIDER NOTE       Roselia Helm, APRN - CNP  06/22/19 1138

## 2019-07-09 ENCOUNTER — OFFICE VISIT (OUTPATIENT)
Dept: FAMILY MEDICINE CLINIC | Age: 19
End: 2019-07-09
Payer: COMMERCIAL

## 2019-07-09 ENCOUNTER — HOSPITAL ENCOUNTER (OUTPATIENT)
Age: 19
Discharge: HOME | End: 2019-07-11
Payer: COMMERCIAL

## 2019-07-09 VITALS
DIASTOLIC BLOOD PRESSURE: 60 MMHG | BODY MASS INDEX: 18.16 KG/M2 | TEMPERATURE: 99 F | HEART RATE: 52 BPM | OXYGEN SATURATION: 98 % | WEIGHT: 106.4 LBS | SYSTOLIC BLOOD PRESSURE: 96 MMHG | HEIGHT: 64 IN | RESPIRATION RATE: 16 BRPM

## 2019-07-09 DIAGNOSIS — R23.1 SKIN PALLOR: ICD-10-CM

## 2019-07-09 DIAGNOSIS — F32.A DEPRESSION, UNSPECIFIED DEPRESSION TYPE: ICD-10-CM

## 2019-07-09 DIAGNOSIS — Z13.220 SCREENING CHOLESTEROL LEVEL: ICD-10-CM

## 2019-07-09 DIAGNOSIS — J01.00 ACUTE MAXILLARY SINUSITIS, RECURRENCE NOT SPECIFIED: ICD-10-CM

## 2019-07-09 DIAGNOSIS — R00.0 TACHYCARDIA: ICD-10-CM

## 2019-07-09 DIAGNOSIS — Z30.09 BIRTH CONTROL COUNSELING: Primary | ICD-10-CM

## 2019-07-09 DIAGNOSIS — E55.9 VITAMIN D DEFICIENCY: ICD-10-CM

## 2019-07-09 DIAGNOSIS — Z13.31 POSITIVE DEPRESSION SCREENING: ICD-10-CM

## 2019-07-09 DIAGNOSIS — R53.83 FATIGUE, UNSPECIFIED TYPE: ICD-10-CM

## 2019-07-09 DIAGNOSIS — Z00.00 PREVENTATIVE HEALTH CARE: ICD-10-CM

## 2019-07-09 LAB
ALBUMIN SERPL-MCNC: 4.4 G/DL (ref 3.5–5.2)
ALP BLD-CCNC: 49 U/L (ref 35–104)
ALT SERPL-CCNC: 8 U/L (ref 0–32)
ANION GAP SERPL CALCULATED.3IONS-SCNC: 17 MMOL/L (ref 7–16)
AST SERPL-CCNC: 13 U/L (ref 0–31)
BASOPHILS ABSOLUTE: 0.04 E9/L (ref 0–0.2)
BASOPHILS RELATIVE PERCENT: 0.9 % (ref 0–2)
BILIRUB SERPL-MCNC: <0.2 MG/DL (ref 0–1.2)
BUN BLDV-MCNC: 13 MG/DL (ref 6–20)
CALCIUM SERPL-MCNC: 9.3 MG/DL (ref 8.6–10.2)
CHLORIDE BLD-SCNC: 107 MMOL/L (ref 98–107)
CHOLESTEROL, TOTAL: 164 MG/DL (ref 0–199)
CO2: 22 MMOL/L (ref 22–29)
CREAT SERPL-MCNC: 0.6 MG/DL (ref 0.5–1)
EOSINOPHILS ABSOLUTE: 0.24 E9/L (ref 0.05–0.5)
EOSINOPHILS RELATIVE PERCENT: 5.1 % (ref 0–6)
GFR AFRICAN AMERICAN: >60
GFR NON-AFRICAN AMERICAN: >60 ML/MIN/1.73
GLUCOSE BLD-MCNC: 64 MG/DL (ref 74–99)
HCT VFR BLD CALC: 40.1 % (ref 34–48)
HDLC SERPL-MCNC: 41 MG/DL
HEMOGLOBIN: 13.3 G/DL (ref 11.5–15.5)
IMMATURE GRANULOCYTES #: 0.02 E9/L
IMMATURE GRANULOCYTES %: 0.4 % (ref 0–5)
LDL CHOLESTEROL CALCULATED: 106 MG/DL (ref 0–99)
LYMPHOCYTES ABSOLUTE: 1.69 E9/L (ref 1.5–4)
LYMPHOCYTES RELATIVE PERCENT: 36 % (ref 20–42)
MCH RBC QN AUTO: 30.4 PG (ref 26–35)
MCHC RBC AUTO-ENTMCNC: 33.2 % (ref 32–34.5)
MCV RBC AUTO: 91.6 FL (ref 80–99.9)
MONOCYTES ABSOLUTE: 0.27 E9/L (ref 0.1–0.95)
MONOCYTES RELATIVE PERCENT: 5.8 % (ref 2–12)
NEUTROPHILS ABSOLUTE: 2.43 E9/L (ref 1.8–7.3)
NEUTROPHILS RELATIVE PERCENT: 51.8 % (ref 43–80)
PDW BLD-RTO: 12.8 FL (ref 11.5–15)
PLATELET # BLD: 204 E9/L (ref 130–450)
PMV BLD AUTO: 9.8 FL (ref 7–12)
POTASSIUM SERPL-SCNC: 4.3 MMOL/L (ref 3.5–5)
RBC # BLD: 4.38 E12/L (ref 3.5–5.5)
SODIUM BLD-SCNC: 146 MMOL/L (ref 132–146)
T4 FREE: 1.3 NG/DL (ref 0.93–1.7)
TOTAL PROTEIN: 7.3 G/DL (ref 6.4–8.3)
TRIGL SERPL-MCNC: 87 MG/DL (ref 0–149)
TSH SERPL DL<=0.05 MIU/L-ACNC: 0.84 UIU/ML (ref 0.27–4.2)
VITAMIN D 25-HYDROXY: 20 NG/ML (ref 30–100)
VLDLC SERPL CALC-MCNC: 17 MG/DL
WBC # BLD: 4.7 E9/L (ref 4.5–11.5)

## 2019-07-09 PROCEDURE — 84439 ASSAY OF FREE THYROXINE: CPT

## 2019-07-09 PROCEDURE — 80061 LIPID PANEL: CPT

## 2019-07-09 PROCEDURE — 99213 OFFICE O/P EST LOW 20 MIN: CPT | Performed by: NURSE PRACTITIONER

## 2019-07-09 PROCEDURE — 36415 COLL VENOUS BLD VENIPUNCTURE: CPT | Performed by: NURSE PRACTITIONER

## 2019-07-09 PROCEDURE — 84443 ASSAY THYROID STIM HORMONE: CPT

## 2019-07-09 PROCEDURE — 80053 COMPREHEN METABOLIC PANEL: CPT

## 2019-07-09 PROCEDURE — 85025 COMPLETE CBC W/AUTO DIFF WBC: CPT

## 2019-07-09 PROCEDURE — 82306 VITAMIN D 25 HYDROXY: CPT

## 2019-07-09 RX ORDER — AZITHROMYCIN 500 MG/1
500 TABLET, FILM COATED ORAL 2 TIMES DAILY
Qty: 6 TABLET | Refills: 0 | Status: SHIPPED | OUTPATIENT
Start: 2019-07-09 | End: 2019-07-12

## 2019-07-09 ASSESSMENT — ENCOUNTER SYMPTOMS
CONSTIPATION: 0
NAUSEA: 1
CHEST TIGHTNESS: 0
CHOKING: 0
RHINORRHEA: 1
VOMITING: 0
DIARRHEA: 0
COUGH: 1
SHORTNESS OF BREATH: 0

## 2019-07-11 DIAGNOSIS — E55.9 VITAMIN D DEFICIENCY: Primary | ICD-10-CM

## 2019-07-11 RX ORDER — ERGOCALCIFEROL 1.25 MG/1
50000 CAPSULE ORAL WEEKLY
Qty: 12 CAPSULE | Refills: 0 | Status: SHIPPED
Start: 2019-07-11 | End: 2020-07-10

## 2019-07-11 RX ORDER — CHOLECALCIFEROL (VITAMIN D3) 50 MCG
2000 TABLET ORAL DAILY
Qty: 90 TABLET | Refills: 0 | Status: SHIPPED
Start: 2019-07-11 | End: 2020-07-10

## 2020-07-10 ENCOUNTER — HOSPITAL ENCOUNTER (EMERGENCY)
Age: 20
Discharge: HOME | End: 2020-07-10
Payer: COMMERCIAL

## 2020-07-10 VITALS
TEMPERATURE: 98.6 F | OXYGEN SATURATION: 99 % | SYSTOLIC BLOOD PRESSURE: 137 MMHG | WEIGHT: 105 LBS | RESPIRATION RATE: 18 BRPM | HEART RATE: 87 BPM | DIASTOLIC BLOOD PRESSURE: 95 MMHG | HEIGHT: 64 IN | BODY MASS INDEX: 17.93 KG/M2

## 2020-07-10 PROCEDURE — 6370000000 HC RX 637 (ALT 250 FOR IP): Performed by: PHYSICIAN ASSISTANT

## 2020-07-10 PROCEDURE — 99282 EMERGENCY DEPT VISIT SF MDM: CPT

## 2020-07-10 RX ORDER — CEPHALEXIN 250 MG/1
500 CAPSULE ORAL ONCE
Status: COMPLETED | OUTPATIENT
Start: 2020-07-10 | End: 2020-07-10

## 2020-07-10 RX ORDER — FLUCONAZOLE 150 MG/1
150 TABLET ORAL ONCE
Qty: 2 TABLET | Refills: 0 | Status: SHIPPED | OUTPATIENT
Start: 2020-07-10 | End: 2020-07-10

## 2020-07-10 RX ORDER — CEPHALEXIN 500 MG/1
500 CAPSULE ORAL 3 TIMES DAILY
Qty: 21 CAPSULE | Refills: 0 | Status: SHIPPED | OUTPATIENT
Start: 2020-07-10 | End: 2020-07-17

## 2020-07-10 RX ORDER — FLUCONAZOLE 100 MG/1
150 TABLET ORAL ONCE
Status: COMPLETED | OUTPATIENT
Start: 2020-07-10 | End: 2020-07-10

## 2020-07-10 RX ADMIN — FLUCONAZOLE 150 MG: 100 TABLET ORAL at 21:47

## 2020-07-10 RX ADMIN — CEPHALEXIN 500 MG: 250 CAPSULE ORAL at 21:46

## 2020-07-11 NOTE — ED PROVIDER NOTES
[facility]    HPI:  7/10/20, Time: 8:55 PM EDT         Conchita Eason is a 22 y.o. female presenting to the ED for possible insect bite, beginning 1 week ago. The complaint has been persistent, moderate in severity, and worsened by nothing. Patient states that she is unsure if she had an insect bite or an infected hair to the left medial ankle. States that she did try to pluck all the hairs out of the area and a small abscess formed. States that it is been erythematous and swollen however the swelling has decreased in the last several days. Area of erythema surrounding the abscess has increased as well as the pain. Denies any swelling to the lower extremity other than localized around the abscess. Patient also reports mild vaginal itching and burning. The symptoms have been ongoing for the last several days. She reports she is had symptoms similar when she is had a yeast infection. Denies any new sexual partners or concern for STD. She has been afebrile at home without recent travel or sick contacts. Patient denies all other symptoms at this time. Review of Systems:   Pertinent positives and negatives are stated within HPI, all other systems reviewed and are negative.          --------------------------------------------- PAST HISTORY ---------------------------------------------  Past Medical History:  has a past medical history of ADHD, Anxiety, Depression, and Scoliosis. Past Surgical History:  has no past surgical history on file. Social History:  reports that she has been smoking cigarettes. She has never used smokeless tobacco. She reports current drug use. Drug: Marijuana. She reports that she does not drink alcohol. Family History: family history includes Cancer in her maternal grandfather and paternal grandmother. The patients home medications have been reviewed. Allergies: Patient has no known allergies.     -------------------------------------------------- RESULTS -------------------------------------------------  All laboratory and radiology results have been personally reviewed by myself   LABS:  No results found for this visit on 07/10/20. RADIOLOGY:  Interpreted by Radiologist.  No orders to display       ------------------------- NURSING NOTES AND VITALS REVIEWED ---------------------------   The nursing notes within the ED encounter and vital signs as below have been reviewed. BP (!) 137/95   Pulse 87   Temp 98.6 °F (37 °C) (Temporal)   Resp 18   Ht 5' 4\" (1.626 m)   Wt 105 lb (47.6 kg)   SpO2 99%   BMI 18.02 kg/m²   Oxygen Saturation Interpretation: Normal      ---------------------------------------------------PHYSICAL EXAM--------------------------------------      Constitutional/General: Alert and oriented x3, well appearing, non toxic in NAD  Head: Normocephalic and atraumatic  Eyes: PERRL, EOMI  Mouth: Oropharynx clear, handling secretions, no trismus  Neck: Supple, full ROM,   Pulmonary: Lungs clear to auscultation bilaterally, no wheezes, rales, or rhonchi. Not in respiratory distress  Cardiovascular:  Regular rate and rhythm, no murmurs, gallops, or rubs. 2+ distal pulses  Abdomen: Soft, non tender, non distended,   Extremities: Moves all extremities x 4. Warm and well perfused  Skin: warm and dry without rash, small area of localized swelling and erythema with central opening to the left medial ankle, no fluctuance, no drainage, minimal surrounding erythema, Homans sign negative  Neurologic: GCS 15,  Psych: Normal Affect      ------------------------------ ED COURSE/MEDICAL DECISION MAKING----------------------  Medications   fluconazole (DIFLUCAN) tablet 150 mg (150 mg Oral Given 7/10/20 2147)   cephALEXin (KEFLEX) capsule 500 mg (500 mg Oral Given 7/10/20 2146)         ED COURSE:       Medical Decision Making:    Patient is a 33-year-old female presenting emergency department with abscess and cellulitis to left medial ankle.   Patient is afebrile no other signs of systemic illness at this time. We will initiate antibiotic treatment for this and recommend very close follow-up with PCP for recheck. Patient also has clinical signs symptoms of vaginal yeast infection. Will prescribe Diflucan as well to try to treat this particularly as she will be taking antibiotics. Advised to follow-up with PCP for recheck return to the emergency department with any new worsening symptoms. Patient and mother voiced understanding and are agreeable to the above treatment plan. Counseling: The emergency provider has spoken with the patient and family member patient and mother and discussed todays results, in addition to providing specific details for the plan of care and counseling regarding the diagnosis and prognosis. Questions are answered at this time and they are agreeable with the plan.      --------------------------------- IMPRESSION AND DISPOSITION ---------------------------------    IMPRESSION  1. Cellulitis and abscess of leg    2. Yeast infection        DISPOSITION  Disposition: Discharge to home  Patient condition is stable      NOTE: This report was transcribed using voice recognition software.  Every effort was made to ensure accuracy; however, inadvertent computerized transcription errors may be present        Tran Pat  07/10/20 8476

## 2020-12-10 ENCOUNTER — OFFICE VISIT (OUTPATIENT)
Dept: FAMILY MEDICINE CLINIC | Age: 20
End: 2020-12-10
Payer: COMMERCIAL

## 2020-12-10 VITALS
TEMPERATURE: 98.1 F | HEIGHT: 64 IN | BODY MASS INDEX: 18.61 KG/M2 | DIASTOLIC BLOOD PRESSURE: 58 MMHG | WEIGHT: 109 LBS | SYSTOLIC BLOOD PRESSURE: 84 MMHG | OXYGEN SATURATION: 97 % | HEART RATE: 66 BPM | RESPIRATION RATE: 16 BRPM

## 2020-12-10 DIAGNOSIS — Z00.00 ROUTINE HEALTH MAINTENANCE: ICD-10-CM

## 2020-12-10 LAB
ANION GAP SERPL CALCULATED.3IONS-SCNC: 8 MMOL/L (ref 7–16)
BASOPHILS ABSOLUTE: 0.03 E9/L (ref 0–0.2)
BASOPHILS RELATIVE PERCENT: 0.6 % (ref 0–2)
BUN BLDV-MCNC: 13 MG/DL (ref 6–20)
CALCIUM SERPL-MCNC: 9.7 MG/DL (ref 8.6–10.2)
CHLORIDE BLD-SCNC: 105 MMOL/L (ref 98–107)
CO2: 25 MMOL/L (ref 22–29)
CREAT SERPL-MCNC: 0.6 MG/DL (ref 0.5–1)
EOSINOPHILS ABSOLUTE: 0.06 E9/L (ref 0.05–0.5)
EOSINOPHILS RELATIVE PERCENT: 1.2 % (ref 0–6)
GFR AFRICAN AMERICAN: >60
GFR NON-AFRICAN AMERICAN: >60 ML/MIN/1.73
GLUCOSE BLD-MCNC: 85 MG/DL (ref 74–99)
HCT VFR BLD CALC: 44.7 % (ref 34–48)
HEMOGLOBIN: 14.9 G/DL (ref 11.5–15.5)
IMMATURE GRANULOCYTES #: 0.02 E9/L
IMMATURE GRANULOCYTES %: 0.4 % (ref 0–5)
LYMPHOCYTES ABSOLUTE: 1.36 E9/L (ref 1.5–4)
LYMPHOCYTES RELATIVE PERCENT: 26.7 % (ref 20–42)
MCH RBC QN AUTO: 29.8 PG (ref 26–35)
MCHC RBC AUTO-ENTMCNC: 33.3 % (ref 32–34.5)
MCV RBC AUTO: 89.4 FL (ref 80–99.9)
MONOCYTES ABSOLUTE: 0.41 E9/L (ref 0.1–0.95)
MONOCYTES RELATIVE PERCENT: 8 % (ref 2–12)
NEUTROPHILS ABSOLUTE: 3.22 E9/L (ref 1.8–7.3)
NEUTROPHILS RELATIVE PERCENT: 63.1 % (ref 43–80)
PDW BLD-RTO: 12.4 FL (ref 11.5–15)
PLATELET # BLD: 237 E9/L (ref 130–450)
PMV BLD AUTO: 9.9 FL (ref 7–12)
POTASSIUM SERPL-SCNC: 4.1 MMOL/L (ref 3.5–5)
RBC # BLD: 5 E12/L (ref 3.5–5.5)
SODIUM BLD-SCNC: 138 MMOL/L (ref 132–146)
WBC # BLD: 5.1 E9/L (ref 4.5–11.5)

## 2020-12-10 PROCEDURE — G8420 CALC BMI NORM PARAMETERS: HCPCS | Performed by: FAMILY MEDICINE

## 2020-12-10 PROCEDURE — 4004F PT TOBACCO SCREEN RCVD TLK: CPT | Performed by: FAMILY MEDICINE

## 2020-12-10 PROCEDURE — 99202 OFFICE O/P NEW SF 15 MIN: CPT | Performed by: FAMILY MEDICINE

## 2020-12-10 PROCEDURE — 99215 OFFICE O/P EST HI 40 MIN: CPT | Performed by: FAMILY MEDICINE

## 2020-12-10 PROCEDURE — G8427 DOCREV CUR MEDS BY ELIG CLIN: HCPCS | Performed by: FAMILY MEDICINE

## 2020-12-10 PROCEDURE — 36415 COLL VENOUS BLD VENIPUNCTURE: CPT | Performed by: FAMILY MEDICINE

## 2020-12-10 PROCEDURE — G8484 FLU IMMUNIZE NO ADMIN: HCPCS | Performed by: FAMILY MEDICINE

## 2020-12-10 ASSESSMENT — PATIENT HEALTH QUESTIONNAIRE - PHQ9
5. POOR APPETITE OR OVEREATING: 0
9. THOUGHTS THAT YOU WOULD BE BETTER OFF DEAD, OR OF HURTING YOURSELF: 0
10. IF YOU CHECKED OFF ANY PROBLEMS, HOW DIFFICULT HAVE THESE PROBLEMS MADE IT FOR YOU TO DO YOUR WORK, TAKE CARE OF THINGS AT HOME, OR GET ALONG WITH OTHER PEOPLE: 2
1. LITTLE INTEREST OR PLEASURE IN DOING THINGS: 2
2. FEELING DOWN, DEPRESSED OR HOPELESS: 3
3. TROUBLE FALLING OR STAYING ASLEEP: 1
SUM OF ALL RESPONSES TO PHQ QUESTIONS 1-9: 10
6. FEELING BAD ABOUT YOURSELF - OR THAT YOU ARE A FAILURE OR HAVE LET YOURSELF OR YOUR FAMILY DOWN: 1
8. MOVING OR SPEAKING SO SLOWLY THAT OTHER PEOPLE COULD HAVE NOTICED. OR THE OPPOSITE, BEING SO FIGETY OR RESTLESS THAT YOU HAVE BEEN MOVING AROUND A LOT MORE THAN USUAL: 0
SUM OF ALL RESPONSES TO PHQ QUESTIONS 1-9: 10
4. FEELING TIRED OR HAVING LITTLE ENERGY: 3
SUM OF ALL RESPONSES TO PHQ9 QUESTIONS 1 & 2: 5
7. TROUBLE CONCENTRATING ON THINGS, SUCH AS READING THE NEWSPAPER OR WATCHING TELEVISION: 0
SUM OF ALL RESPONSES TO PHQ QUESTIONS 1-9: 10

## 2020-12-10 ASSESSMENT — COLUMBIA-SUICIDE SEVERITY RATING SCALE - C-SSRS
2. HAVE YOU ACTUALLY HAD ANY THOUGHTS OF KILLING YOURSELF?: NO
1. WITHIN THE PAST MONTH, HAVE YOU WISHED YOU WERE DEAD OR WISHED YOU COULD GO TO SLEEP AND NOT WAKE UP?: NO
6. HAVE YOU EVER DONE ANYTHING, STARTED TO DO ANYTHING, OR PREPARED TO DO ANYTHING TO END YOUR LIFE?: NO

## 2020-12-10 ASSESSMENT — ENCOUNTER SYMPTOMS
TROUBLE SWALLOWING: 0
SORE THROAT: 0
CHOKING: 0
DIARRHEA: 0
ABDOMINAL DISTENTION: 0
SINUS PAIN: 0
EYE ITCHING: 0
SHORTNESS OF BREATH: 0
SINUS PRESSURE: 0
COLOR CHANGE: 0
NAUSEA: 0
CHEST TIGHTNESS: 0
EYE PAIN: 0
BACK PAIN: 1
CONSTIPATION: 0
ABDOMINAL PAIN: 0

## 2020-12-10 NOTE — PATIENT INSTRUCTIONS
Patient Education        Learning About Benefits From Quitting Smoking  How does quitting smoking make you healthier? If you're thinking about quitting smoking, you may have a few reasons to be smoke-free. Your health may be one of them. · When you quit smoking, you lower your risks for cancer, lung disease, heart attack, stroke, blood vessel disease, and blindness from macular degeneration. · When you're smoke-free, you get sick less often, and you heal faster. You are less likely to get colds, flu, bronchitis, and pneumonia. · As a nonsmoker, you may find that your mood is better and you are less stressed. When and how will you feel healthier? Quitting has real health benefits that start from day 1 of being smoke-free. And the longer you stay smoke-free, the healthier you get and the better you feel. The first hours  · After just 20 minutes, your blood pressure and heart rate go down. That means there's less stress on your heart and blood vessels. · Within 12 hours, the level of carbon monoxide in your blood drops back to normal. That makes room for more oxygen. With more oxygen in your body, you may notice that you have more energy than when you smoked. After 2 weeks  · Your lungs start to work better. · Your risk of heart attack starts to drop. After 1 month  · When your lungs are clear, you cough less and breathe deeper, so it's easier to be active. · Your sense of taste and smell return. That means you can enjoy food more than you have since you started smoking. Over the years  · Over the years, your risks of heart disease, heart attack, and stroke are lower. · After 10 years, your risk of dying from lung cancer is cut by about half. And your risk for many other types of cancer is lower too. How would quitting help others in your life? When you quit smoking, you improve the health of everyone who now breathes in your smoke.   · Their heart, lung, and cancer risks drop, much like yours.  · They are sick less. For babies and small children, living smoke-free means they're less likely to have ear infections, pneumonia, and bronchitis. · If you're a woman who is or will be pregnant someday, quitting smoking means a healthier . · Children who are close to you are less likely to become adult smokers. Where can you learn more? Go to https://chpepiceweb.[employer]. org and sign in to your [employer] account. Enter [phone] [id number] [phone] in the [facility] box to learn more about \"Learning About Benefits From Quitting Smoking. \"     If you do not have an account, please click on the \"Sign Up Now\" link. Current as of: 2020               Content Version: 12.6   [employer]. Care instructions adapted under license by [facility] ([facility]). If you have questions about a medical condition or this instruction, always ask your healthcare professional. [address] [address] any warranty or liability for your use of this information. Patient Education        Multiple Sclerosis (MS): Care Instructions  Your Care Instructions  Multiple sclerosis, also called MS, is a disease that can affect the brain, spinal cord, and nerves to the eyes. MS can cause problems with muscle control and strength, vision, balance, feeling, and thinking. Whatever your symptoms are, taking medicine correctly and following your doctor's advice for home care can help you maintain your quality of life. Follow-up care is a key part of your treatment and safety. Be sure to make and go to all appointments, and call your doctor if you are having problems. It's also a good idea to know your test results and keep a list of the medicines you take. How can you care for yourself at home? General care  · Take your medicines exactly as prescribed. Call your doctor if you think you are having a problem with your medicine. · Use a cane, walker, or scooter if your doctor suggests it.   · Keep doing your normal activities as much as you can. · If you have problems urinating, press or tap your bladder area to help start urine flow. If you have trouble controlling your urine, plan your fluid intake and activities so that a toilet will be available when you need it. · Spend time with family and friends. Join a support group for people with MS if you want extra help. · Depression is common with this condition. Tell your doctor if you have trouble sleeping, are eating too much or are not hungry, or feel sad or tearful all the time. Depression can be treated with medicine and counseling. Diet and exercise  · Eat a balanced diet. · If you have problems swallowing, change how and what you eat:  ? Try thick drinks, such as milk shakes. They are easier to swallow than other fluids. ? Do not eat foods that crumble easily. These can cause choking. ? Use a  to prepare food. Soft foods need less chewing. ? Eat small meals often so that you do not get tired from eating larger meals. · Get exercise on most days. Work with your doctor to set up a program of walking, swimming, or other exercise that you are able to do. A physical therapist can teach you exercises if you cannot walk but can move your limbs and trunk. Or you can do exercises to help with coordination and balance. You can help improve muscle stiffness by doing exercises while lying in certain positions. When should you call for help? Call your doctor now or seek immediate medical care if:    · You have a change in symptoms.     · You fall or have another injury.     · You have symptoms of a urinary infection. For example:  ? You have blood or pus in your urine. ? You have pain in your back just below your rib cage. This is called flank pain. ? You have a fever, chills, or body aches. ? It hurts to urinate. ? You have groin or belly pain.    Watch closely for changes in your health, and be sure to contact your doctor if:    · You want more information about MS or medicines.     · You have questions about alternative treatments. Do not use any other treatments without talking to your doctor first.   Where can you learn more? Go to https://Mutations Studiopebasestone.[employer]. org and sign in to your [employer] account. Enter W327 in the [employer] box to learn more about \"Multiple Sclerosis (MS): Care Instructions. \"     If you do not have an account, please click on the \"Sign Up Now\" link. Current as of: November 20, 2019               Content Version: 12.6  © 8949-5868 [employer]. Care instructions adapted under license by [facility] ([facility]). If you have questions about a medical condition or this instruction, always ask your healthcare professional. [address] any warranty or liability for your use of this information.

## 2020-12-10 NOTE — PROGRESS NOTES
S: Wendie Panchal 23 y.o. female  here for new to provider care. Chief concerns today include back pain/muscle spasms  PMH: Anxiety/Depression, ADHD, scoliosis  Back pain related to scoliosis. Progressively worsening since diagnosis 4 years ago. Last X-rays 2016 thoracolumbar spine remarkable for thoracic curvature  Additional symptoms include numbness and tingling of bilateral hips, L>R, and headaches and visual changes, including transient loss of vision, in postorbital region bilaterally. PT in 2017 worsened pain. She does HEP program with minimal symptom improvement. No other med or other therapeutic trials. + muscle stiffness/spasms  Depression: PHQ-9=5. Known history. Denies SI/HI. Trial es citalopram subjectively ineffective. Anxious; inquired about a PRN medication for anxiety  SH: Occasional marijuana; denies tobacco and EtOH  O: VS: BP (!) 84/58   Pulse 66   Temp 98.1 °F (36.7 °C) (Temporal)   Resp 16   Ht 5' 4\" (1.626 m)   Wt 109 lb (49.4 kg)   LMP 11/23/2020 (Approximate)   SpO2 97%   BMI 18.71 kg/m²    General: NAD   CV:  RRR, no gallops, rubs, or murmurs   Resp: CTAB no R/R/W   Abd:  Soft, nontender, no masses    Ext:  no C/C/E              Neuro: comprehensive neuro, including sensation, strength, reflexes, CN, gait/station. Exam remarkable brisk reflexes, UE>LE    Assessment / Plan:      Aden Barrientos was seen today for new patient and health maintenance. Diagnoses and all orders for this visit:    1. Concern/ R/O for Multiple Sclerosis ; hyperreflexia, transient vision loss   - physical exam positive for hyperreflexia in upper and lower extremities (3-4+ bilaterally)  - no family hx of MS  - key complaints including transient vision loss bilaterally, muscle spasms and stiffness  - ordered BRAIN MRI w and without contrast    - CBC  - BMP     2.  Back pain and muscle spasms  - likely secondary to scoliosis vs MS (rule out)  - Brain MRI ordered  - previous thoracic and lumbar X rays in 2016 ; lumbar unremarkable, thoracic; mild S shaped curvature       2. Moderate major depression (HCC)  - ongoing concern  - PHQ 2: scored 5 , PHQ 9- scored 10, denies suicidal concerns and ideations  - has not seen therapist recently and not interested at this time  - continue to monitor     3. Anxiety   - continue to monitor  - using occasional marijuana and self help methods to relax     4. Sexually Active; no contraception  - patient denies use of any contraceptive or barrier protection  - Sexually active with one male partner  - scheduled for PAP smear next visit  - Chlamydia and HIV screen performed this visit      4. Routine health maintenance  - CBC  - BMP  - HIV screen  - Chlamydia Screen     RTO 2 weeks or sooner for PAP smear. Will await MRI results. Patient can come in sooner for change of symptoms or new concerns PRN. Scoliosis: repeat X-rays  Neuro S/S paresthesias, weakness, visual changes: differential diagnosis may include MS. MRI of brain  Anxiety: counseling per PCP provided  Sexually active without contraception: counseling per PCP provided  HM:         Return in about 2 weeks (around 12/24/2020) for Pap Smear; Cervical Cancer Screening. Attending Physician Statement  I have discussed the case, including pertinent history and exam findings with the resident. I also have seen the patient and performed key portions of the examination. I agree with the documented assessment and plan.          Simone Smith MD

## 2020-12-10 NOTE — PROGRESS NOTES
CC:  Establishment of care; Concerns for back pain, muscle spasms and transient vision loss    HPI:  23 y.o. female with pmh of depression, anxiety, scoliosis and ADHD who presents for establishment of care. Patient has multiple concerns addressed below:    1) Scoliosis presenting with muscle spasms and difficulty getting out of bed  Patient states she has been experiencing numbness and tingling of the hips R> L  Transient vision loss bilaterally with postero orbital headaches and pulsations  Stiffness and difficulty getting out of bed in the morning  Denies loss of bowel and bladder control  Is sexually active and feels that there is decreases sensation during intercourse     Has done PT; strained and hurt herself. Continues to keep up with home exercises which have minimally improved symptoms. 2) Right breast pain  - happening for the last two months  - swollen on the under side  - sharp pains - 3-4; pulsing pain  - has not tried any medications  - happens usually around time of menstruation  - denies SOB    3) Dizziness  - when standing up too quickly  - in a hot room  - was concerned she may be anemic ; last CBC 2019 wnl    4) Depression/ Anxiety  - Stopped all anxiety, depression meds ; previously on escitalopram  - Smoking marijuana, tobacco use  - Finds that this helps her a whole lot more  - Not often; a puff or two after work or a quick puff before bed  - states that most of her anxiety and depression stems from relationship with father. Believes that she is \"not good enough\" and made to feel that way with parents. Described depression and anxiety as situational.    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      Patient Active Problem List    Diagnosis Date Noted    Depression 06/09/2019    Anxiety 06/09/2019    Encounter for pregnancy test, result unknown 06/09/2019    Birth control counseling 06/09/2019       Past Medical History:   Diagnosis Date    ADHD  Anxiety     Depression     Scoliosis        No current outpatient medications on file prior to visit. No current facility-administered medications on file prior to visit. No Known Allergies    Family History   Problem Relation Age of Onset    Cancer Maternal Grandfather     Cancer Paternal Grandmother        No past surgical history on file. Social History     Tobacco Use    Smoking status: Current Every Day Smoker     Types: Cigarettes    Smokeless tobacco: Never Used    Tobacco comment: 3-4 cigarettes   Substance Use Topics    Alcohol use: No     Comment: occasionally    Drug use: Yes     Types: Marijuana     Comment: occasional       ROS:    Review of Systems   Constitutional: Positive for activity change and fatigue. Negative for appetite change, chills and diaphoresis. HENT: Negative for sinus pressure, sinus pain, sore throat and trouble swallowing. Eyes: Negative for pain and itching. Respiratory: Negative for choking, chest tightness and shortness of breath. Cardiovascular: Negative for chest pain and palpitations. Gastrointestinal: Negative for abdominal distention, abdominal pain, constipation, diarrhea and nausea. Endocrine: Negative for cold intolerance and heat intolerance. Genitourinary: Negative for difficulty urinating, hematuria and pelvic pain. Musculoskeletal: Positive for back pain. Negative for joint swelling. Skin: Negative for color change and rash. Neurological: Positive for dizziness and headaches. Psychiatric/Behavioral: Positive for sleep disturbance. Negative for decreased concentration, hallucinations, self-injury and suicidal ideas. The patient is nervous/anxious. Objective:    VS:  Blood pressure (!) 84/58, pulse 66, temperature 98.1 °F (36.7 °C), temperature source Temporal, resp. rate 16, height 5' 4\" (1.626 m), weight 109 lb (49.4 kg), last menstrual period 11/23/2020, SpO2 97 %, not currently breastfeeding.      Physical Exam Constitutional: She is oriented to person, place, and time. She appears well-developed and well-nourished. No distress. HENT:   Head: Normocephalic and atraumatic. Eyes: Pupils are equal, round, and reactive to light. Conjunctivae and EOM are normal. Right eye exhibits no discharge. Left eye exhibits no discharge. No scleral icterus. Neck: Normal range of motion. Neck supple. Cardiovascular: Normal rate, regular rhythm, normal heart sounds and intact distal pulses. Exam reveals no gallop and no friction rub. No murmur heard. Pulmonary/Chest: Effort normal and breath sounds normal. No respiratory distress. She has no wheezes. She has no rales. She exhibits no tenderness. Right breast exhibits no inverted nipple, no mass, no nipple discharge, no skin change and no tenderness. Left breast exhibits no inverted nipple, no mass, no nipple discharge, no skin change and no tenderness. Breasts are symmetrical.   Abdominal: Soft. Bowel sounds are normal. She exhibits no distension. There is no abdominal tenderness. There is no rebound. Musculoskeletal: Normal range of motion. General: Deformity present. No tenderness or edema. Comments: Mild thoracic scoliosis   Neurological: She is alert and oriented to person, place, and time. She displays abnormal reflex. No cranial nerve deficit. Coordination normal.   DTRs 3-4 + bilateral upper and lower extremities  Normal Gait  Normal ROM   Skin: Skin is warm and dry. No rash noted. She is not diaphoretic. No erythema. No pallor. Psychiatric: She has a normal mood and affect. Her behavior is normal. Judgment and thought content normal.   Has signs and symptoms of depression, multifactorial     Assessment:    1.   Concern/ R/O for Multiple Sclerosis ; hyperreflexia, transient vision loss   - physical exam positive for hyperreflexia in upper and lower extremities (3-4+ bilaterally)  - no family hx of MS  - key complaints including transient vision loss bilaterally, muscle spasms and stiffness  - ordered BRAIN MRI w and without contrast    - [facility]  - [facility]    2. Back pain and muscle spasms  - likely secondary to scoliosis vs MS (rule out)  - Brain MRI ordered  - previous thoracic and lumbar X rays in 2016 ; lumbar unremarkable, thoracic; mild S shaped curvature      2. Moderate major depression (HCC)  - ongoing concern  - PHQ 2: scored 5 , PHQ 9- scored 10, denies suicidal concerns and ideations  - has not seen therapist recently and not interested at this time  - continue to monitor    3. Anxiety   - continue to monitor  - using occasional marijuana and self help methods to relax    4. Sexually Active; no contraception  - patient denies use of any contraceptive or barrier protection  - Sexually active with one male partner  - scheduled for PAP smear next visit  - Chlamydia and HIV screen performed this visit     4. Routine health maintenance  - [facility]  - [facility]  - HIV screen  - Chlamydia Screen    RTO 2 weeks or sooner for PAP smear. Will await MRI results. Patient can come in sooner for change of symptoms or new concerns PRN. Please see Patient Instructions for further counseling and information given. Advised to please be adherent to the treatment plans discussed today, and please call with any questions or concerns, letting the office know of any reasons that the plans may not be followed. The risks of untreated conditions include worsening illness, injury, disability, and possibly, death. Please call if symptoms change in any way, worsen, or fail to completely resolve, as this could necessitate a change to treatment plans. Patient and/or caregiver expressed understanding. Indications and proper use of medication(s) reviewed. Potential side-effects and risks of medication(s) also explained. Patient and/or caregiver was instructed to call if any new symptoms develop prior to next visit. Health risk factors discussed and addressed.      Electronically signed by Navdeep Gomez @  PGY-1 on 12/10/2020 at 8:36 AM  This case was discussed with attending physician: Dr. Sb Rodriguez

## 2020-12-11 ENCOUNTER — TELEPHONE (OUTPATIENT)
Dept: FAMILY MEDICINE CLINIC | Age: 20
End: 2020-12-11

## 2020-12-11 LAB — HIV-1 AND HIV-2 ANTIBODIES: NORMAL

## 2020-12-11 NOTE — TELEPHONE ENCOUNTER
Aden Barrientos called in and is stating that she has her MRI scheduled for 12/16, she is asking if you could call something in for her to take before the mri to calm her anxiety down.   Thank you

## 2020-12-11 NOTE — PROGRESS NOTES
Patient requesting pre-procedural anti-anxiety medication for MRI. Prescribing 0.5 mg Ativan prior to procedure.

## 2020-12-14 LAB
C. TRACHOMATIS DNA ,URINE: NEGATIVE
N. GONORRHOEAE DNA, URINE: NEGATIVE
SOURCE: NORMAL

## 2020-12-14 RX ORDER — LORAZEPAM 0.5 MG/1
0.5 TABLET ORAL EVERY 8 HOURS PRN
Qty: 3 TABLET | Refills: 0 | Status: SHIPPED | OUTPATIENT
Start: 2020-12-14 | End: 2020-12-15

## 2020-12-19 ENCOUNTER — HOSPITAL ENCOUNTER (OUTPATIENT)
Dept: MRI IMAGING | Age: 20
Discharge: HOME | End: 2020-12-21
Payer: COMMERCIAL

## 2020-12-19 PROCEDURE — A9577 INJ MULTIHANCE: HCPCS | Performed by: RADIOLOGY

## 2020-12-19 PROCEDURE — 6360000004 HC RX CONTRAST MEDICATION: Performed by: RADIOLOGY

## 2020-12-19 PROCEDURE — 70553 MRI BRAIN STEM W/O & W/DYE: CPT

## 2020-12-19 RX ADMIN — GADOBENATE DIMEGLUMINE 11 ML: 529 INJECTION, SOLUTION INTRAVENOUS at 11:34

## 2021-01-13 ENCOUNTER — OFFICE VISIT (OUTPATIENT)
Dept: FAMILY MEDICINE CLINIC | Age: 21
End: 2021-01-13
Payer: COMMERCIAL

## 2021-01-13 VITALS
BODY MASS INDEX: 18.44 KG/M2 | SYSTOLIC BLOOD PRESSURE: 123 MMHG | OXYGEN SATURATION: 98 % | HEART RATE: 83 BPM | DIASTOLIC BLOOD PRESSURE: 78 MMHG | HEIGHT: 64 IN | WEIGHT: 108 LBS | TEMPERATURE: 98.2 F

## 2021-01-13 DIAGNOSIS — F41.9 ANXIETY: Primary | ICD-10-CM

## 2021-01-13 DIAGNOSIS — F32.A DEPRESSION, UNSPECIFIED DEPRESSION TYPE: ICD-10-CM

## 2021-01-13 DIAGNOSIS — Z12.4 ENCOUNTER FOR PAPANICOLAOU SMEAR FOR CERVICAL CANCER SCREENING: ICD-10-CM

## 2021-01-13 PROBLEM — F93.9 EMOTIONAL DISTURBANCE OF CHILDHOOD OR ADOLESCENCE: Status: ACTIVE | Noted: 2021-01-13

## 2021-01-13 PROBLEM — G47.9 SLEEP DISORDER: Status: ACTIVE | Noted: 2021-01-13

## 2021-01-13 PROBLEM — N92.0 MENORRHAGIA WITH REGULAR CYCLE: Status: ACTIVE | Noted: 2021-01-13

## 2021-01-13 PROBLEM — M54.6 THORACIC BACK PAIN: Status: ACTIVE | Noted: 2021-01-13

## 2021-01-13 PROBLEM — M54.40 MIDLINE LOW BACK PAIN WITH SCIATICA: Status: ACTIVE | Noted: 2021-01-13

## 2021-01-13 PROCEDURE — 4004F PT TOBACCO SCREEN RCVD TLK: CPT | Performed by: FAMILY MEDICINE

## 2021-01-13 PROCEDURE — 99213 OFFICE O/P EST LOW 20 MIN: CPT | Performed by: FAMILY MEDICINE

## 2021-01-13 PROCEDURE — G8427 DOCREV CUR MEDS BY ELIG CLIN: HCPCS | Performed by: FAMILY MEDICINE

## 2021-01-13 PROCEDURE — G8484 FLU IMMUNIZE NO ADMIN: HCPCS | Performed by: FAMILY MEDICINE

## 2021-01-13 PROCEDURE — G8420 CALC BMI NORM PARAMETERS: HCPCS | Performed by: FAMILY MEDICINE

## 2021-01-13 RX ORDER — CYCLOBENZAPRINE HCL 5 MG
TABLET ORAL
COMMUNITY
Start: 2020-12-29 | End: 2021-02-04 | Stop reason: SDUPTHER

## 2021-01-13 RX ORDER — METHYLPREDNISOLONE 4 MG/1
TABLET ORAL
COMMUNITY
Start: 2020-12-29 | End: 2021-02-04 | Stop reason: ALTCHOICE

## 2021-01-13 RX ORDER — HYDROXYZINE HYDROCHLORIDE 25 MG/1
25 TABLET, FILM COATED ORAL PRN
Qty: 30 TABLET | Refills: 0 | Status: SHIPPED | OUTPATIENT
Start: 2021-01-13 | End: 2021-02-12

## 2021-01-13 RX ORDER — SERTRALINE HYDROCHLORIDE 25 MG/1
25 TABLET, FILM COATED ORAL DAILY
Qty: 30 TABLET | Refills: 0 | Status: SHIPPED
Start: 2021-01-13 | End: 2021-01-25

## 2021-01-13 ASSESSMENT — ENCOUNTER SYMPTOMS
EYE DISCHARGE: 0
DIARRHEA: 0
EYE PAIN: 0
CONSTIPATION: 0
CHOKING: 0
ABDOMINAL PAIN: 0
COUGH: 0
BACK PAIN: 1

## 2021-01-13 NOTE — PROGRESS NOTES
CC:  Pap Smear and follow up on anxiety     HPI:  23 y.o. female with pmh of depression,anxiety, and migraines presents for pap smear and follow up for anxiety. Previously talked about anxiety and trying a different medication  Was previously on escitalopram but did not like how it made her feel. Would like to try a different one. Has been having a lot of personal issues and has been feeling very depressed and anxious. Denies any thoughts of hurting herself. Sleep has been good ; sleeping for 5 hours, and sleeping more afterwards. Has been waking up before an alarm. Just wakes up on her own. Has been extra panicky ; breaking up with boyfriend. Had previously seen a therapist but did not feel that it was therapeutic. Would like to try again at this stage. Cervical Cancer Screen: PAP Smear    G0, P0, Ab 0. Doing well. Periods: regular, heavy. Has never tried any pain control or birth control for dysmenorrhea. Menarche: age 13  Last menstrual cycle: Dec 21 2020   Sexually active :  Sexually active with one partner at this time. Has had 13 sexual partners overall. Unsure about birth control because she forgets about taking OCPs. Unsure about long term birth control including mirena or IUD but open for discussion. No abdominal or pelvic pain. No dyspareunia. No abnormal vaginal  discharge. Never had a pap smear before. History of cervical cancer on maternal side of family  No urinary or GI symptoms. Medical/ surgical history and medications reviewed. Does not want flu vaccine. No other complaints at this time. Patient Active Problem List    Diagnosis Date Noted    Depression 06/09/2019    Anxiety 06/09/2019    Encounter for pregnancy test, result unknown 06/09/2019    Birth control counseling 06/09/2019       Past Medical History:   Diagnosis Date    ADHD     Anxiety     Depression     Scoliosis        No current outpatient medications on file prior to visit.      No current facility-administered medications on file prior to visit. No Known Allergies    Family History   Problem Relation Age of Onset    Cancer Maternal Grandfather     Cancer Paternal Grandmother        No past surgical history on file. Social History     Tobacco Use    Smoking status: Current Every Day Smoker     Types: Cigarettes    Smokeless tobacco: Never Used    Tobacco comment: 3-4 cigarettes   Substance Use Topics    Alcohol use: No     Comment: occasionally    Drug use: Yes     Types: Marijuana     Comment: occasional       ROS:    Review of Systems   Constitutional: Positive for appetite change. Negative for chills and fever. HENT: Negative for ear discharge and ear pain. Eyes: Negative for pain and discharge. Respiratory: Negative for cough and choking. Cardiovascular: Negative for chest pain and leg swelling. Gastrointestinal: Negative for abdominal pain, constipation and diarrhea. Genitourinary: Negative for difficulty urinating and pelvic pain. Musculoskeletal: Positive for back pain and myalgias. Neurological: Positive for headaches. Negative for numbness. Psychiatric/Behavioral: Positive for decreased concentration and sleep disturbance. Negative for agitation, confusion, dysphoric mood, hallucinations, self-injury and suicidal ideas. The patient is nervous/anxious. Objective:    VS:  Blood pressure 123/78, pulse 83, temperature 98.2 °F (36.8 °C), temperature source Temporal, height 5' 4\" (1.626 m), weight 108 lb (49 kg), last menstrual period 12/23/2020, SpO2 98 %, not currently breastfeeding. Physical Exam   Constitutional: She is oriented to person, place, and time. She appears well-developed and well-nourished. HENT:   Head: Normocephalic and atraumatic. Cardiovascular: Normal rate, regular rhythm, normal heart sounds and intact distal pulses. Exam reveals no gallop and no friction rub. No murmur heard.   Pulmonary/Chest: Effort normal and breath sounds normal. No respiratory distress. She has no wheezes. She has no rales. She exhibits no tenderness. Abdominal: Soft. Bowel sounds are normal. She exhibits no distension. There is no abdominal tenderness. Genitourinary:    Vagina normal.   No labial fusion. There is no rash, tenderness, lesion or injury on the right labia. There is no rash, tenderness, lesion or injury on the left labia. Cervix exhibits no motion tenderness, no discharge and no friability. No vaginal discharge, erythema, tenderness or bleeding. No erythema, tenderness or bleeding in the vagina. No foreign body in the vagina. No signs of injury in the vagina. Genitourinary Comments: Pap Smear performed. Musculoskeletal: Normal range of motion. General: No tenderness, deformity or edema. Neurological: She is alert and oriented to person, place, and time. No cranial nerve deficit. Coordination normal.   Skin: Skin is warm and dry. No rash noted. No erythema. No pallor. Psychiatric: Her behavior is normal. Judgment and thought content normal.     Assessment/ Plan    1. Anxiety  - sertraline 25 mg once daily    - hydrOXYzine 25 mg PRN as needed for panic episodes  - consulting psychiatric therapist for mood    2. Depression, unspecified depression type    - sertraline 25 mg once daily    3. Encounter for Papanicolaou smear for cervical cancer screening  - initial pap smear performed today. RTO 3-4 weeks for assessment of anxiety/ depression or sooner prn for any persistent, new, or worsening symptoms. Please see Patient Instructions for further counseling and information given. Advised to please be adherent to the treatment plans discussed today, and please call with any questions or concerns, letting the office know of any reasons that the plans may not be followed. The risks of untreated conditions include worsening illness, injury, disability, and possibly, death.  Please call if symptoms change in any way, worsen, or fail to completely resolve, as this could necessitate a change to treatment plans. Patient and/or caregiver expressed understanding. Indications and proper use of medication(s) reviewed. Potential side-effects and risks of medication(s) also explained. Patient and/or caregiver was instructed to call if any new symptoms develop prior to next visit. Health risk factors discussed and addressed.      Electronically signed by Elinor Alvarado @  PGY-1 on 1/13/2021 at 3:21 PM  This case was discussed with attending physician: Dr. Eleanor Dixon

## 2021-01-19 LAB
HPV SAMPLE: NORMAL
HPV TYPE 16: NOT DETECTED
HPV TYPE 18: NOT DETECTED
HPV, HIGH RISK OTHER: NOT DETECTED
INTERPRETATION: NORMAL
SOURCE: NORMAL

## 2021-01-25 ENCOUNTER — TELEPHONE (OUTPATIENT)
Dept: FAMILY MEDICINE CLINIC | Age: 21
End: 2021-01-25

## 2021-01-25 DIAGNOSIS — F32.A DEPRESSION, UNSPECIFIED DEPRESSION TYPE: Primary | ICD-10-CM

## 2021-01-25 RX ORDER — ESCITALOPRAM OXALATE 10 MG/1
10 TABLET ORAL DAILY
Qty: 30 TABLET | Refills: 1 | Status: SHIPPED | OUTPATIENT
Start: 2021-01-25 | End: 2021-04-02

## 2021-01-25 NOTE — PROGRESS NOTES
Received patient call, stating that she does not like the feeling she gets from Zoloft. Prescribed 10 mg PO daily Lexapro.

## 2021-01-25 NOTE — TELEPHONE ENCOUNTER
Please let patient know I have prescribed her 10 mg Lexapro PO daily. She can pick it up from her pharmacy at her soonest convenience.

## 2021-02-04 ENCOUNTER — OFFICE VISIT (OUTPATIENT)
Dept: FAMILY MEDICINE CLINIC | Age: 21
End: 2021-02-04
Payer: COMMERCIAL

## 2021-02-04 VITALS
HEIGHT: 64 IN | WEIGHT: 110 LBS | RESPIRATION RATE: 16 BRPM | BODY MASS INDEX: 18.78 KG/M2 | HEART RATE: 81 BPM | SYSTOLIC BLOOD PRESSURE: 115 MMHG | DIASTOLIC BLOOD PRESSURE: 63 MMHG | OXYGEN SATURATION: 95 % | TEMPERATURE: 97.5 F

## 2021-02-04 DIAGNOSIS — M54.50 LOW BACK PAIN WITHOUT SCIATICA, UNSPECIFIED BACK PAIN LATERALITY, UNSPECIFIED CHRONICITY: ICD-10-CM

## 2021-02-04 DIAGNOSIS — F41.9 ANXIETY: ICD-10-CM

## 2021-02-04 DIAGNOSIS — F32.0 CURRENT MILD EPISODE OF MAJOR DEPRESSIVE DISORDER WITHOUT PRIOR EPISODE (HCC): Primary | ICD-10-CM

## 2021-02-04 DIAGNOSIS — Z30.09 COUNSELING FOR INITIATION OF BIRTH CONTROL METHOD: ICD-10-CM

## 2021-02-04 PROCEDURE — G8484 FLU IMMUNIZE NO ADMIN: HCPCS | Performed by: FAMILY MEDICINE

## 2021-02-04 PROCEDURE — 4004F PT TOBACCO SCREEN RCVD TLK: CPT | Performed by: FAMILY MEDICINE

## 2021-02-04 PROCEDURE — G8427 DOCREV CUR MEDS BY ELIG CLIN: HCPCS | Performed by: FAMILY MEDICINE

## 2021-02-04 PROCEDURE — G8420 CALC BMI NORM PARAMETERS: HCPCS | Performed by: FAMILY MEDICINE

## 2021-02-04 PROCEDURE — 99213 OFFICE O/P EST LOW 20 MIN: CPT | Performed by: FAMILY MEDICINE

## 2021-02-04 PROCEDURE — 99212 OFFICE O/P EST SF 10 MIN: CPT | Performed by: FAMILY MEDICINE

## 2021-02-04 RX ORDER — CYCLOBENZAPRINE HCL 5 MG
5 TABLET ORAL 2 TIMES DAILY PRN
Qty: 60 TABLET | Refills: 0 | Status: SHIPPED | OUTPATIENT
Start: 2021-02-04 | End: 2021-03-06

## 2021-02-04 ASSESSMENT — ENCOUNTER SYMPTOMS
EYE ITCHING: 0
EYE PAIN: 0
CONSTIPATION: 0
DIARRHEA: 0
ABDOMINAL PAIN: 0
CHEST TIGHTNESS: 0
COLOR CHANGE: 0
BACK PAIN: 1
SHORTNESS OF BREATH: 0

## 2021-02-04 NOTE — PROGRESS NOTES
S:  This is a 38-year-old female with past medical history of depression and anxiety who presents today for follow-up and to talk about anxiety. Generalized Anxiety and Depression:  Patient states that she still having anxiety triggers. She lives with her mother. Is always felt that her parents have dictated her life has felt that she has never been able to make any decisions on her own. For the last 3 weeks states that she has been feeling tired a lot; has been sleeping 12 to 18 hours a day. Has been getting frequent stomachaches, frequent headaches, endorses decreases in concentration and lack of motivation. States she is able to go to work but as soon as she comes home she just goes to sleep. Trouble performing normal ADLs, states that it has been difficult for her to even shower these days. 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 Patient states she took a bottle of pills containing Tylenol and naproxen. Was taken to the emergency room where a charcoal drink was given for her to vomit the contents. Which patient was admitted to [facility]. Birth Control and Contraception  - hx of dysmenorrhea  - has never been on OCPs before  - sexually active, but does not use protection  - would like more information    Blood pressure 115/63, pulse 81, temperature 97.5 °F (36.4 °C), temperature source Cerebral, resp. rate 16, height 5' 4\" (1.626 m), weight 110 lb (49.9 kg), last menstrual period 01/21/2021, SpO2 95 %, not currently breastfeeding.     HEENT WNL     Heart regular    Lungs clear    abd non-tender      No edema    Pulses intact     Assessment/ Plan:    Depression  -SIGECAPS positive  - Denies any suicidal ideation  - History of suicidal attempt  - Started Lexapro 1 week ago  -Follow-up appointment with Dr. Mohan Kay for next week    Generalized Anxiety Disorder  -Started Lexapro 1 week ago  -Continue to monitor    Attending Physician Statement  I have discussed the case, including pertinent history and exam findings with the resident. I agree with the documented assessment and plan.

## 2021-02-04 NOTE — PROGRESS NOTES
pain with sciatica 01/13/2021    Thoracic back pain 01/13/2021    Menorrhagia with regular cycle 01/13/2021    Depression 06/09/2019    Anxiety 06/09/2019    Encounter for pregnancy test, result unknown 06/09/2019    Birth control counseling 06/09/2019    Excessive and frequent menstruation 08/26/2016    Lumbago with sciatica 08/26/2016    Low back pain 02/02/2016       Past Medical History:   Diagnosis Date    ADHD     Anxiety     Depression     Scoliosis        Current Outpatient Medications on File Prior to Visit   Medication Sig Dispense Refill    escitalopram (LEXAPRO) 10 MG tablet Take 1 tablet by mouth daily 30 tablet 1    cyclobenzaprine (FLEXERIL) 5 MG tablet TAKE 1 TABLET BY MOUTH 3 TIMES A DAY AS NEEDED FOR MUSCLE SPASMS FOR UP TO 5 DAYS.  hydrOXYzine (ATARAX) 25 MG tablet Take 1 tablet by mouth as needed for Anxiety (Patient not taking: Reported on 2/4/2021) 30 tablet 0     No current facility-administered medications on file prior to visit. No Known Allergies    Family History   Problem Relation Age of Onset    Cancer Maternal Grandfather     Cancer Paternal Grandmother        No past surgical history on file. Social History     Tobacco Use    Smoking status: Current Every Day Smoker     Types: Cigarettes    Smokeless tobacco: Never Used    Tobacco comment: 3-4 cigarettes   Substance Use Topics    Alcohol use: No     Comment: occasionally    Drug use: Yes     Types: Marijuana     Comment: occasional       ROS:    Review of Systems   Constitutional: Positive for activity change and fatigue. Negative for fever. HENT: Negative for congestion and ear pain. Eyes: Negative for pain and itching. Respiratory: Negative for chest tightness and shortness of breath. Cardiovascular: Negative for chest pain and leg swelling. Gastrointestinal: Negative for abdominal pain, constipation and diarrhea. Genitourinary: Negative for difficulty urinating and flank pain. Musculoskeletal: Positive for back pain and myalgias. Skin: Negative for color change and pallor. Neurological: Positive for headaches. Negative for dizziness and numbness. Psychiatric/Behavioral: Positive for decreased concentration and sleep disturbance. Negative for agitation, hallucinations, self-injury and suicidal ideas. The patient is nervous/anxious. Objective:    VS:  Blood pressure 115/63, pulse 81, temperature 97.5 °F (36.4 °C), temperature source Cerebral, resp. rate 16, height 5' 4\" (1.626 m), weight 110 lb (49.9 kg), last menstrual period 01/21/2021, SpO2 95 %, not currently breastfeeding. Physical Exam   Constitutional: She is oriented to person, place, and time. She appears well-developed and well-nourished. HENT:   Head: Normocephalic and atraumatic. Cardiovascular: Normal rate, regular rhythm, normal heart sounds and intact distal pulses. Exam reveals no gallop and no friction rub. No murmur heard. Pulmonary/Chest: Effort normal and breath sounds normal. No respiratory distress. She has no wheezes. She has no rales. She exhibits no tenderness. Abdominal: Soft. Bowel sounds are normal. She exhibits no distension. There is no abdominal tenderness. Musculoskeletal: Normal range of motion. General: No tenderness, deformity or edema. Neurological: She is alert and oriented to person, place, and time. She exhibits normal muscle tone. Coordination normal.   Skin: Skin is warm and dry. No rash noted. No erythema. No pallor. Psychiatric: She has a normal mood and affect. Her behavior is normal. Judgment and thought content normal.     Assessment/ Plan    1. Current mild episode of major depressive disorder without prior episode ([facility] [address]  - SIGECAPS +   - no active suicidal ideation  - recently started lexapro 10 mg  - amenable to setting up appointment Claudia Figueredo    - continue to monitor and follow up in a few weeks    2.  Anxiety  Likely tied with her depression and home situation  Started lexapro 10mg  Continue to monitor and follow up in a few weeks    3. Low back pain without sciatica, unspecified back pain laterality, unspecified chronicity  - locate charts from [facility]  - Flexeril PRN    4. Counseling on initiation of birth control and emergency contraception  - patient is sexually active with boyfriend and utilize \"pull out\" method  - more information on birth control and different methods provided  - patient to think about and discuss during next visit. RTO 3 weeks or sooner prn for any persistent, new, or worsening symptoms. Please see Patient Instructions for further counseling and information given. Advised to please be adherent to the treatment plans discussed today, and please call with any questions or concerns, letting the office know of any reasons that the plans may not be followed. The risks of untreated conditions include worsening illness, injury, disability, and possibly, death. Please call if symptoms change in any way, worsen, or fail to completely resolve, as this could necessitate a change to treatment plans. Patient and/or caregiver expressed understanding. Indications and proper use of medication(s) reviewed. Potential side-effects and risks of medication(s) also explained. Patient and/or caregiver was instructed to call if any new symptoms develop prior to next visit. Health risk factors discussed and addressed.      Electronically signed by Adam Tavera @  PGY-1 on 2/4/2021 at 3:13 PM  This case was discussed with attending physician: Dr. Melina Avelar

## 2021-02-09 ENCOUNTER — OFFICE VISIT (OUTPATIENT)
Dept: PSYCHOLOGY | Age: 21
End: 2021-02-09
Payer: COMMERCIAL

## 2021-02-09 DIAGNOSIS — F33.1 MAJOR DEPRESSIVE DISORDER, RECURRENT EPISODE, MODERATE (HCC): Primary | ICD-10-CM

## 2021-02-09 DIAGNOSIS — F41.9 ANXIETY DISORDER, UNSPECIFIED TYPE: ICD-10-CM

## 2021-02-09 PROCEDURE — 90791 PSYCH DIAGNOSTIC EVALUATION: CPT | Performed by: PSYCHOLOGIST
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
## 2021-02-10 ASSESSMENT — ANXIETY QUESTIONNAIRES
2. NOT BEING ABLE TO STOP OR CONTROL WORRYING: 3-NEARLY EVERY DAY
4. TROUBLE RELAXING: 2-OVER HALF THE DAYS
3. WORRYING TOO MUCH ABOUT DIFFERENT THINGS: 3-NEARLY EVERY DAY
7. FEELING AFRAID AS IF SOMETHING AWFUL MIGHT HAPPEN: 2-OVER HALF THE DAYS
6. BECOMING EASILY ANNOYED OR IRRITABLE: 3-NEARLY EVERY DAY

## 2021-02-10 ASSESSMENT — PATIENT HEALTH QUESTIONNAIRE - PHQ9
SUM OF ALL RESPONSES TO PHQ QUESTIONS 1-9: 22
7. TROUBLE CONCENTRATING ON THINGS, SUCH AS READING THE NEWSPAPER OR WATCHING TELEVISION: 2
1. LITTLE INTEREST OR PLEASURE IN DOING THINGS: 3
8. MOVING OR SPEAKING SO SLOWLY THAT OTHER PEOPLE COULD HAVE NOTICED. OR THE OPPOSITE, BEING SO FIGETY OR RESTLESS THAT YOU HAVE BEEN MOVING AROUND A LOT MORE THAN USUAL: 2
SUM OF ALL RESPONSES TO PHQ9 QUESTIONS 1 & 2: 6
9. THOUGHTS THAT YOU WOULD BE BETTER OFF DEAD, OR OF HURTING YOURSELF: 0
5. POOR APPETITE OR OVEREATING: 3

## 2021-03-03 ENCOUNTER — OFFICE VISIT (OUTPATIENT)
Dept: FAMILY MEDICINE CLINIC | Age: 21
End: 2021-03-03
Payer: COMMERCIAL

## 2021-03-03 VITALS
TEMPERATURE: 97.8 F | OXYGEN SATURATION: 100 % | SYSTOLIC BLOOD PRESSURE: 121 MMHG | WEIGHT: 109 LBS | HEIGHT: 64 IN | HEART RATE: 58 BPM | BODY MASS INDEX: 18.61 KG/M2 | DIASTOLIC BLOOD PRESSURE: 71 MMHG | RESPIRATION RATE: 16 BRPM

## 2021-03-03 DIAGNOSIS — G89.29 CHRONIC MIDLINE LOW BACK PAIN WITH SCIATICA, SCIATICA LATERALITY UNSPECIFIED: ICD-10-CM

## 2021-03-03 DIAGNOSIS — M54.40 CHRONIC MIDLINE LOW BACK PAIN WITH SCIATICA, SCIATICA LATERALITY UNSPECIFIED: ICD-10-CM

## 2021-03-03 DIAGNOSIS — F41.9 ANXIETY: ICD-10-CM

## 2021-03-03 DIAGNOSIS — N94.6 DYSMENORRHEA: Primary | ICD-10-CM

## 2021-03-03 PROCEDURE — G8427 DOCREV CUR MEDS BY ELIG CLIN: HCPCS | Performed by: FAMILY MEDICINE

## 2021-03-03 PROCEDURE — G8420 CALC BMI NORM PARAMETERS: HCPCS | Performed by: FAMILY MEDICINE

## 2021-03-03 PROCEDURE — G8484 FLU IMMUNIZE NO ADMIN: HCPCS | Performed by: FAMILY MEDICINE

## 2021-03-03 PROCEDURE — 1036F TOBACCO NON-USER: CPT | Performed by: FAMILY MEDICINE

## 2021-03-03 PROCEDURE — 99213 OFFICE O/P EST LOW 20 MIN: CPT | Performed by: FAMILY MEDICINE

## 2021-03-03 RX ORDER — HYDROXYZINE HYDROCHLORIDE 25 MG/1
25 TABLET, FILM COATED ORAL EVERY 8 HOURS PRN
Qty: 30 TABLET | Refills: 0 | Status: SHIPPED | OUTPATIENT
Start: 2021-03-03 | End: 2021-03-13

## 2021-03-03 RX ORDER — NAPROXEN 500 MG/1
500 TABLET ORAL 2 TIMES DAILY WITH MEALS
Qty: 60 TABLET | Refills: 0 | Status: SHIPPED
Start: 2021-03-03 | End: 2021-04-02

## 2021-03-03 SDOH — ECONOMIC STABILITY: TRANSPORTATION INSECURITY
IN THE PAST 12 MONTHS, HAS THE LACK OF TRANSPORTATION KEPT YOU FROM MEDICAL APPOINTMENTS OR FROM GETTING MEDICATIONS?: NOT ASKED

## 2021-03-03 SDOH — ECONOMIC STABILITY: FOOD INSECURITY: WITHIN THE PAST 12 MONTHS, THE FOOD YOU BOUGHT JUST DIDN'T LAST AND YOU DIDN'T HAVE MONEY TO GET MORE.: SOMETIMES TRUE

## 2021-03-03 SDOH — ECONOMIC STABILITY: TRANSPORTATION INSECURITY
IN THE PAST 12 MONTHS, HAS LACK OF TRANSPORTATION KEPT YOU FROM MEETINGS, WORK, OR FROM GETTING THINGS NEEDED FOR DAILY LIVING?: NOT ASKED

## 2021-03-03 SDOH — ECONOMIC STABILITY: FOOD INSECURITY: WITHIN THE PAST 12 MONTHS, YOU WORRIED THAT YOUR FOOD WOULD RUN OUT BEFORE YOU GOT MONEY TO BUY MORE.: SOMETIMES TRUE

## 2021-03-03 ASSESSMENT — ENCOUNTER SYMPTOMS
DIARRHEA: 1
NAUSEA: 1
SHORTNESS OF BREATH: 0
SINUS PAIN: 0
VOMITING: 1
BACK PAIN: 1
SINUS PRESSURE: 0
ABDOMINAL DISTENTION: 0
ABDOMINAL PAIN: 1
CHEST TIGHTNESS: 0

## 2021-03-03 ASSESSMENT — PATIENT HEALTH QUESTIONNAIRE - PHQ9
5. POOR APPETITE OR OVEREATING: 3
10. IF YOU CHECKED OFF ANY PROBLEMS, HOW DIFFICULT HAVE THESE PROBLEMS MADE IT FOR YOU TO DO YOUR WORK, TAKE CARE OF THINGS AT HOME, OR GET ALONG WITH OTHER PEOPLE: 1
3. TROUBLE FALLING OR STAYING ASLEEP: 3
9. THOUGHTS THAT YOU WOULD BE BETTER OFF DEAD, OR OF HURTING YOURSELF: 0
SUM OF ALL RESPONSES TO PHQ QUESTIONS 1-9: 16
SUM OF ALL RESPONSES TO PHQ QUESTIONS 1-9: 16
8. MOVING OR SPEAKING SO SLOWLY THAT OTHER PEOPLE COULD HAVE NOTICED. OR THE OPPOSITE, BEING SO FIGETY OR RESTLESS THAT YOU HAVE BEEN MOVING AROUND A LOT MORE THAN USUAL: 1
6. FEELING BAD ABOUT YOURSELF - OR THAT YOU ARE A FAILURE OR HAVE LET YOURSELF OR YOUR FAMILY DOWN: 1
SUM OF ALL RESPONSES TO PHQ9 QUESTIONS 1 & 2: 2

## 2021-03-03 ASSESSMENT — COLUMBIA-SUICIDE SEVERITY RATING SCALE - C-SSRS
1. WITHIN THE PAST MONTH, HAVE YOU WISHED YOU WERE DEAD OR WISHED YOU COULD GO TO SLEEP AND NOT WAKE UP?: NO
2. HAVE YOU ACTUALLY HAD ANY THOUGHTS OF KILLING YOURSELF?: NO

## 2021-03-03 NOTE — PATIENT INSTRUCTIONS
Patient Education     LOW BACK PAIN [employer] [email or web address]     Learning About Cyclic Vomiting Syndrome  What is it? An adult or child who has cyclic vomiting syndrome (CVS) has repeated bouts of severe vomiting and nausea. Between the vomiting episodes, the person's health is normal. The cause of CVS isn't known, but it may be related to migraine headaches. What happens when you have CVS?  CVS causes severe nausea, vomiting, and drooling. You may not be able to walk or talk during an episode. You may look pale. You may have a fever and feel very tired and thirsty. Some people with CVS have belly pain and diarrhea. Bouts of vomiting can last for a few hours or a few days. People with this condition tend to have a typical pattern of attacks. For example, one person may have bouts of CVS 4 times a year and always in the morning. Another person may have 8 bouts a year and always in the evening. Some people with CVS have triggers that set off the vomiting. People have reported an infection, such as a cold, as a trigger. Other triggers include stress, menstrual cycles, and certain foods like chocolate or cheese. Children tend to have more triggers than adults do. How is CVS treated? Treatment is centered around easing the nausea and vomiting. The doctor may prescribe medicine. During very bad bouts of vomiting, your doctor may want you to stay in the hospital for a while. You may get fluids through a vein (IV) to prevent dehydration. Dehydration can be serious. Your body needs fluids to make enough blood. Without a good supply of blood, vital organs such as the heart and brain can't work as well as they should. When should you call for help? Call your doctor now or seek immediate medical care if:  · You have new or worse belly pain. · You have a fever. · You are vomiting. · You cannot pass stools or gas.   · You have symptoms of dehydration, such as:  ? Dry eyes and a dry mouth.  ? Passing only a little urine. ? Feeling thirstier than usual.  Watch closely for changes in your health, and be sure to contact your doctor if you have any problems. Follow-up care is a key part of your treatment and safety. Be sure to make and go to all appointments, and call your doctor if you are having problems. It's also a good idea to know your test results and keep a list of the medicines you take. Where can you learn more? Go to https://[employer] O[employer] Studios[employer] Lifeforms.PanelClaw. org and sign in to your [employer] account. Enter G643 in the [employer] box to learn more about \"Learning About Cyclic Vomiting Syndrome. \"     If you do not have an account, please click on the \"Sign Up Now\" link. Current as of: April 15, 2020               Content Version: 12.6  © 5728-6946 [employer] Inc[employer]. Care instructions adapted under license by [facility] ([facility]). If you have questions about a medical condition or this instruction, always ask your healthcare professional. [place] [address] any warranty or liability for your use of this information. Patient Education        Back Spasm: Care Instructions  Your Care Instructions  A back spasm is sudden tightness and pain in your back muscles. It may happen from overuse or an injury. Things like sleeping in an awkward way, bending, lifting, standing, or sitting can sometimes cause a spasm. But the cause isn't always clear. Home treatment includes using heat or ice, taking over-the-counter (OTC) pain medicines, and avoiding activities that may cause back pain. For a back spasm that doesn't get better with home care, your doctor may prescribe medicine. Treatments such as massage or manipulation may also help ease a back spasm. Your doctor may also suggest exercise or physical therapy to help improve strength and flexibility in your back muscles. In most cases, getting back to your normal activities is good for your back.  Just make sure to avoid doing things that make your pain worse. Follow-up care is a key part of your treatment and safety. Be sure to make and go to all appointments, and call your doctor if you are having problems. It's also a good idea to know your test results and keep a list of the medicines you take. How can you care for yourself at home? Heat, ice, and medicines    · To relieve pain, use heat or ice (whichever feels better) on the affected area. ? Put a warm water bottle, a heating pad set on low, or a warm cloth on your back. Put a thin cloth between the heating pad and your skin. Do not go to sleep with a heating pad on your skin. ? Try ice or a cold pack on the area for 10 to 20 minutes at a time. Put a thin cloth between the ice and your skin.     · For most back pain you can take over-the-counter pain medicine. Nonsteroidal anti-inflammatory drugs (NSAIDs) such as ibuprofen or naproxen seem to work best. But if you can't take NSAIDs you can try acetaminophen. Your doctor can prescribe stronger medicines if needed. Be safe with medicines. Read and follow all instructions on the label. Body positions and posture    · Sit or lie in positions that are most comfortable for you and that reduce pain. Try one of these positions when you lie down:  ? Lie on your back with your knees bent and supported by large pillows. ? Lie on the floor with your legs on the seat of a sofa or chair. ? Lie on your side with your knees and hips bent and a pillow between your legs. ? Lie on your stomach if it does not make pain worse.     · Do not sit up in bed. Avoid soft couches and twisted positions.     · Avoid bed rest after the first day of back pain. Bed rest can help relieve pain at first, but it delays healing. Continued rest without activity is usually not good for your back.     · If you must sit for long periods of time, take breaks from sitting. Change positions every 30 minutes.  Get up and walk around, or lie in a comfortable position. Activity    · Take short walks several times a day. You can start with 5 to 10 minutes, 3 or 4 times a day, and work up to longer walks. Walk on level surfaces and avoid hills and stairs until your back starts to feel better.     · After your back spasm starts to feel better, try to stretch your muscles every day, especially before and after exercise and at bedtime. Regular stretching can help relax your muscles.     · To prevent future back pain, do exercises to stretch and strengthen your back and stomach. Learn to use good posture, safe lifting techniques, and other ways to move to help you avoid back pain. When should you call for help? Call 911 anytime you think you may need emergency care. For example, call if:    · You are unable to move an arm or a leg at all. Call your doctor now or seek immediate medical care if:    · You have new or worse symptoms in your legs, belly, or buttocks. Symptoms may include:  ? Numbness or tingling. ? Weakness. ? Pain.     · You lose bladder or bowel control. Watch closely for changes in your health, and be sure to contact your doctor if:    · You have a fever, lose weight, or don't feel well.     · You do not get better as expected. Where can you learn more? Go to https://[employer].Swifto. org and sign in to your [employer] account. Enter E232 in the [employer] box to learn more about \"Back Spasm: Care Instructions. \"     If you do not have an account, please click on the \"Sign Up Now\" link. Current as of: March 2, 2020               Content Version: 12.6  © 7456-2813 [employer], Incorporated. Care instructions adapted under license by [facility] ([facility]). If you have questions about a medical condition or this instruction, always ask your healthcare professional. [place] [address] any warranty or liability for your use of this information.          Patient Education        Low Back Pain: Exercises  Introduction  Here are some examples of exercises for you to try. The exercises may be suggested for a condition or for rehabilitation. Start each exercise slowly. Ease off the exercises if you start to have pain. You will be told when to start these exercises and which ones will work best for you. How to do the exercises  Press-up   1. Lie on your stomach, supporting your body with your forearms. 2. Press your elbows down into the floor to raise your upper back. As you do this, relax your stomach muscles and allow your back to arch without using your back muscles. As your press up, do not let your hips or pelvis come off the floor. 3. Hold for 15 to 30 seconds, then relax. 4. Repeat 2 to 4 times. Alternate arm and leg (bird dog) exercise   Do this exercise slowly. Try to keep your body straight at all times, and do not let one hip drop lower than the other. 1. Start on the floor, on your hands and knees. 2. Tighten your belly muscles. 3. Raise one leg off the floor, and hold it straight out behind you. Be careful not to let your hip drop down, because that will twist your trunk. 4. Hold for about 6 seconds, then lower your leg and switch to the other leg. 5. Repeat 8 to 12 times on each leg. 6. Over time, work up to holding for 10 to 30 seconds each time. 7. If you feel stable and secure with your leg raised, try raising the opposite arm straight out in front of you at the same time. Knee-to-chest exercise   1. Lie on your back with your knees bent and your feet flat on the floor. 2. Bring one knee to your chest, keeping the other foot flat on the floor (or keeping the other leg straight, whichever feels better on your lower back). 3. Keep your lower back pressed to the floor. Hold for at least 15 to 30 seconds. 4. Relax, and lower the knee to the starting position. 5. Repeat with the other leg. Repeat 2 to 4 times with each leg.   6. To get more stretch, put your other leg flat on the floor while pulling your knee to your chest.    Curl-ups   1. Lie on the floor on your back with your knees bent at a 90-degree angle. Your feet should be flat on the floor, about 12 inches from your buttocks. 2. Cross your arms over your chest. If this bothers your neck, try putting your hands behind your neck (not your head), with your elbows spread apart. 3. Slowly tighten your belly muscles and raise your shoulder blades off the floor. 4. Keep your head in line with your body, and do not press your chin to your chest.  5. Hold this position for 1 or 2 seconds, then slowly lower yourself back down to the floor. 6. Repeat 8 to 12 times. Pelvic tilt exercise   1. Lie on your back with your knees bent. 2. \"Brace\" your stomach. This means to tighten your muscles by pulling in and imagining your belly button moving toward your spine. You should feel like your back is pressing to the floor and your hips and pelvis are rocking back. 3. Hold for about 6 seconds while you breathe smoothly. 4. Repeat 8 to 12 times. Heel dig bridging   1. Lie on your back with both knees bent and your ankles bent so that only your heels are digging into the floor. Your knees should be bent about 90 degrees. 2. Then push your heels into the floor, squeeze your buttocks, and lift your hips off the floor until your shoulders, hips, and knees are all in a straight line. 3. Hold for about 6 seconds as you continue to breathe normally, and then slowly lower your hips back down to the floor and rest for up to 10 seconds. 4. Do 8 to 12 repetitions. Hamstring stretch in doorway   1. Lie on your back in a doorway, with one leg through the open door. 2. Slide your leg up the wall to straighten your knee. You should feel a gentle stretch down the back of your leg. 3. Hold the stretch for at least 15 to 30 seconds. Do not arch your back, point your toes, or bend either knee. Keep one heel touching the floor and the other heel touching the wall.   4. Repeat with your other leg. 5. Do 2 to 4 times for each leg. Hip flexor stretch   1. Kneel on the floor with one knee bent and one leg behind you. Place your forward knee over your foot. Keep your other knee touching the floor. 2. Slowly push your hips forward until you feel a stretch in the upper thigh of your rear leg. 3. Hold the stretch for at least 15 to 30 seconds. Repeat with your other leg. 4. Do 2 to 4 times on each side. Wall sit   1. Stand with your back 10 to 12 inches away from a wall. 2. Lean into the wall until your back is flat against it. 3. Slowly slide down until your knees are slightly bent, pressing your lower back into the wall. 4. Hold for about 6 seconds, then slide back up the wall. 5. Repeat 8 to 12 times. Follow-up care is a key part of your treatment and safety. Be sure to make and go to all appointments, and call your doctor if you are having problems. It's also a good idea to know your test results and keep a list of the medicines you take. Where can you learn more? Go to https://[employer].Yooneed.com. org and sign in to your [employer] account. Enter W316 in the [employer] box to learn more about \"Low Back Pain: Exercises. \"     If you do not have an account, please click on the \"Sign Up Now\" link. Current as of: March 2, 2020               Content Version: 12.6  © 5133-7197 [employer], Incorporated. Care instructions adapted under license by [facility] ([facility]). If you have questions about a medical condition or this instruction, always ask your healthcare professional. [address] any warranty or liability for your use of this information.

## 2021-03-03 NOTE — PROGRESS NOTES
CC:  Follow up for Anxiety    HPI:  23 y.o. female with pmh anxiety/ depression, dysmennorhea and LBP who presents for follow up for anxiety/ depression and LBP. Back pain  Continues to experience low back pain  Muscle spasms improved with Flexeril   Xrays from [facility] Reviewed    Anxiety/Depression  Previously sleeping 18 hours  Currently sleeping 12-14 hours   Continues to experience mild anxiety ; improved from previous  States Lexapro is helping  No recent panic attacks    Medication Side effects  Was recently started on Lexapro and states she has been having GI side effects  Diarrhea, N/V, cramps, bloating    Birth Control  May be interested in depo provera shot   Would like more time to think    Marijuana use  Smoking 1 gm in a joint daily  States she had her appointment with Dr. Mejia De La Torre  Does not believe that marijuana is making her anxiety or situation worse. Believes that she is coping better because of it.        Patient Active Problem List    Diagnosis Date Noted    Emotional disturbance of childhood or adolescence 01/13/2021    Sleep disorder 01/13/2021    Midline low back pain with sciatica 01/13/2021    Thoracic back pain 01/13/2021    Menorrhagia with regular cycle 01/13/2021    Depression 06/09/2019    Anxiety 06/09/2019    Encounter for pregnancy test, result unknown 06/09/2019    Birth control counseling 06/09/2019    Excessive and frequent menstruation 08/26/2016    Lumbago with sciatica 08/26/2016    Low back pain 02/02/2016       Past Medical History:   Diagnosis Date    ADHD     Anxiety     Depression     Scoliosis        Current Outpatient Medications on File Prior to Visit   Medication Sig Dispense Refill    cyclobenzaprine (FLEXERIL) 5 MG tablet Take 1 tablet by mouth 2 times daily as needed for Muscle spasms 60 tablet 0    escitalopram (LEXAPRO) 10 MG tablet Take 1 tablet by mouth daily 30 tablet 1     No current facility-administered medications on file prior to heard.  Pulmonary/Chest: Effort normal and breath sounds normal. No respiratory distress. She has no wheezes. She has no rales. Abdominal: Soft. Bowel sounds are normal. She exhibits no distension. There is no abdominal tenderness. There is no rebound. Musculoskeletal: Normal range of motion. General: No tenderness, deformity or edema. Neurological: She is alert and oriented to person, place, and time. DTR's 3+   Skin: Skin is warm and dry. No rash noted. She is not diaphoretic. No erythema. No pallor. Psychiatric: She has a normal mood and affect. Her behavior is normal. Judgment and thought content normal.     Assessment/ Plan    1. Anxiety  - Continue on Lexapro for now  - Reiterated importance of taking lexapro at night with food. - monitor for side effects ; start Hydroxizine to see if side effects improved. 2. Dysmenorrhea  - Patient has increased pain with periods which may be increased her anxiety  - Naproxen 500 mg BID PRN  - Continue to talk about Birth control methods ; patient has list and does not want to consider any IUDs, condoms or implants. 3. Chronic midline low back pain with sciatica, sciatica laterality unspecified  - Continue flexeril  - LBP exercises given  - Youtube: Dr. Colleen Heller for more back pain exercises  - Can use Naproxen as needed. - Continue to monitor      RTO 1 or sooner prn for any persistent, new, or worsening symptoms. Please see Patient Instructions for further counseling and information given. Advised to please be adherent to the treatment plans discussed today, and please call with any questions or concerns, letting the office know of any reasons that the plans may not be followed. The risks of untreated conditions include worsening illness, injury, disability, and possibly, death. Please call if symptoms change in any way, worsen, or fail to completely resolve, as this could necessitate a change to treatment plans.  Patient and/or caregiver expressed understanding. Indications and proper use of medication(s) reviewed. Potential side-effects and risks of medication(s) also explained. Patient and/or caregiver was instructed to call if any new symptoms develop prior to next visit. Health risk factors discussed and addressed.      Electronically signed by Theresa Grande @  PGY-1 on 3/3/2021 at 1:57 PM  This case was discussed with attending physician: Dr. Walker Martínez

## 2021-04-01 DIAGNOSIS — F32.A DEPRESSION, UNSPECIFIED DEPRESSION TYPE: ICD-10-CM

## 2021-04-02 DIAGNOSIS — N94.6 DYSMENORRHEA: ICD-10-CM

## 2021-04-02 RX ORDER — ESCITALOPRAM OXALATE 10 MG/1
TABLET ORAL
Qty: 30 TABLET | Refills: 1 | Status: SHIPPED
Start: 2021-04-02 | End: 2021-04-13

## 2021-04-02 RX ORDER — NAPROXEN 500 MG/1
TABLET ORAL
Qty: 60 TABLET | Refills: 0 | Status: SHIPPED
Start: 2021-04-02 | End: 2021-11-03 | Stop reason: SDUPTHER

## 2021-04-02 NOTE — TELEPHONE ENCOUNTER
Last Appointment:  3/3/2021  Future Appointments   Date Time Provider Nhi Bender   4/13/2021  2:30 PM MD Johanny Stanford [facility]

## 2021-04-13 ENCOUNTER — OFFICE VISIT (OUTPATIENT)
Dept: FAMILY MEDICINE CLINIC | Age: 21
End: 2021-04-13
Payer: COMMERCIAL

## 2021-04-13 VITALS
TEMPERATURE: 97.7 F | OXYGEN SATURATION: 99 % | SYSTOLIC BLOOD PRESSURE: 111 MMHG | DIASTOLIC BLOOD PRESSURE: 66 MMHG | WEIGHT: 108 LBS | RESPIRATION RATE: 16 BRPM | HEIGHT: 64 IN | BODY MASS INDEX: 18.44 KG/M2 | HEART RATE: 82 BPM

## 2021-04-13 DIAGNOSIS — F41.9 ANXIETY: ICD-10-CM

## 2021-04-13 DIAGNOSIS — M54.50 LOW BACK PAIN WITHOUT SCIATICA, UNSPECIFIED BACK PAIN LATERALITY, UNSPECIFIED CHRONICITY: Primary | ICD-10-CM

## 2021-04-13 DIAGNOSIS — Z30.09 BIRTH CONTROL COUNSELING: ICD-10-CM

## 2021-04-13 PROCEDURE — 1036F TOBACCO NON-USER: CPT | Performed by: FAMILY MEDICINE

## 2021-04-13 PROCEDURE — 99213 OFFICE O/P EST LOW 20 MIN: CPT | Performed by: FAMILY MEDICINE

## 2021-04-13 PROCEDURE — G8427 DOCREV CUR MEDS BY ELIG CLIN: HCPCS | Performed by: FAMILY MEDICINE

## 2021-04-13 PROCEDURE — G8420 CALC BMI NORM PARAMETERS: HCPCS | Performed by: FAMILY MEDICINE

## 2021-04-13 PROCEDURE — 99212 OFFICE O/P EST SF 10 MIN: CPT | Performed by: FAMILY MEDICINE

## 2021-04-13 RX ORDER — CYCLOBENZAPRINE HCL 5 MG
5 TABLET ORAL 2 TIMES DAILY PRN
Qty: 60 TABLET | Refills: 1 | Status: SHIPPED | OUTPATIENT
Start: 2021-04-13 | End: 2021-06-12

## 2021-04-13 ASSESSMENT — ENCOUNTER SYMPTOMS
CHEST TIGHTNESS: 0
DIARRHEA: 0
SORE THROAT: 0
SHORTNESS OF BREATH: 0
CONSTIPATION: 0
BACK PAIN: 1
EYE PAIN: 0
EYE ITCHING: 0
SINUS PRESSURE: 0
ABDOMINAL PAIN: 0

## 2021-04-13 NOTE — PROGRESS NOTES
S: 23 y.o. female here for mood and back pain. Was prescribed lexapro 10 mg for about 2 months and stopped it 1 week ago since she is feeling better. No s/h ideation. Mood is better since she moved out of her parent's home and living with her boyfriend. Chronic low back pain with hx of mild scoliosis. Taking flexeril bid prn which helps. No red flags. Sexually active with boyfriend. She is interested in [facility]. O: VS: /66 (Site: Right Upper Arm, Position: Sitting, Cuff Size: Medium Adult)   Pulse 82   Temp 97.7 °F (36.5 °C) (Cerebral)   Resp 16   Ht 5' 4\" (1.626 m)   Wt 108 lb (49 kg)   LMP 03/25/2021   SpO2 99%   BMI 18.54 kg/m²    General: NAD   CV:  RRR, no gallops, rubs, or murmurs   Resp: CTAB no R/R/W   Abd:  Soft, nontender, no masses    Ext:  no C/C/E  Impression/Plan:   1. Anxiety-ok off meds. Will follow. 2. Scoliosis-refill flexeril. 3. Contraception-pt is still considering nexplanon but will discuss next time. rto in 4 weeks      Attending Physician Statement  I have discussed the case, including pertinent history and exam findings with the resident. I agree with the documented assessment and plan.         Bao Lerma MD

## 2021-04-13 NOTE — PROGRESS NOTES
Start date:      Quit date: 2021     Years since quittin.2    Smokeless tobacco: Never Used    Tobacco comment: 3-4 cigarettes   Substance Use Topics    Alcohol use: No     Comment: occasionally    Drug use: Yes     Types: Marijuana     Comment: occasional       ROS:    Review of Systems   Constitutional: Negative for activity change and appetite change. HENT: Negative for sinus pressure and sore throat. Eyes: Negative for pain and itching. Respiratory: Negative for chest tightness and shortness of breath. Cardiovascular: Negative for chest pain and leg swelling. Gastrointestinal: Negative for abdominal pain, constipation and diarrhea. Genitourinary: Negative for difficulty urinating, flank pain and frequency. Musculoskeletal: Positive for back pain and myalgias. Neurological: Negative for dizziness and numbness. Psychiatric/Behavioral: Positive for decreased concentration. The patient is not nervous/anxious. Objective:    VS:  Blood pressure 111/66, pulse 82, temperature 97.7 °F (36.5 °C), temperature source Cerebral, resp. rate 16, height 5' 4\" (1.626 m), weight 108 lb (49 kg), last menstrual period 2021, SpO2 99 %, not currently breastfeeding. Physical Exam   Constitutional: She is oriented to person, place, and time. She appears well-developed and well-nourished. HENT:   Head: Normocephalic and atraumatic. Cardiovascular: Normal rate, regular rhythm and normal heart sounds. Exam reveals no gallop and no friction rub. No murmur heard. Pulmonary/Chest: Effort normal and breath sounds normal. No respiratory distress. She has no wheezes. She has no rales. Abdominal: Soft. Bowel sounds are normal. She exhibits no distension. There is no abdominal tenderness. There is no rebound. Musculoskeletal: Normal range of motion. General: No tenderness, deformity or edema. Neurological: She is alert and oriented to person, place, and time.  She exhibits normal muscle tone. Coordination normal.   Skin: Skin is warm and dry. No rash noted. No erythema. No pallor. Psychiatric: She has a normal mood and affect. Her behavior is normal. Judgment and thought content normal.     Assessment/ Plan    1. Low back pain without sciatica, unspecified back pain laterality, unspecified chronicity  - patient can continue on flexeril 5 mg BID PRN  - continue naproxen as needed  - continue to monitor  - patient to continue lower back exercises. - spinal x rays were recently performed, patient does not need new xrays at this time    2. Anxiety  - patient recently moved to a new apartment, away from previous triggers. - States she self discontinued taking lexapro as it was giving her too much stomach pain ; also feels that she is doing well off the medication now because her triggers have been removed. - will monitor off medication for now and see how she does. 3. Birth control counseling  - importance of birth control discussed again this visit  - patient shows interest in nexplanon ; has already tried nuva ring and patch, not interested in depo provera or IUD  - would like to reassess next visit ; will need OB GYN referral at that time. RTO 1 month or sooner prn for any persistent, new, or worsening symptoms. Please see Patient Instructions for further counseling and information given. Advised to please be adherent to the treatment plans discussed today, and please call with any questions or concerns, letting the office know of any reasons that the plans may not be followed. The risks of untreated conditions include worsening illness, injury, disability, and possibly, death. Please call if symptoms change in any way, worsen, or fail to completely resolve, as this could necessitate a change to treatment plans. Patient and/or caregiver expressed understanding. Indications and proper use of medication(s) reviewed.   Potential side-effects and risks of

## 2021-05-19 ENCOUNTER — OFFICE VISIT (OUTPATIENT)
Dept: FAMILY MEDICINE CLINIC | Age: 21
End: 2021-05-19
Payer: COMMERCIAL

## 2021-05-19 VITALS
BODY MASS INDEX: 19.81 KG/M2 | HEART RATE: 80 BPM | TEMPERATURE: 98.7 F | DIASTOLIC BLOOD PRESSURE: 58 MMHG | SYSTOLIC BLOOD PRESSURE: 100 MMHG | WEIGHT: 116 LBS | HEIGHT: 64 IN | RESPIRATION RATE: 16 BRPM

## 2021-05-19 DIAGNOSIS — I83.90 ASYMPTOMATIC VARICOSE VEINS: ICD-10-CM

## 2021-05-19 DIAGNOSIS — M54.50 LOW BACK PAIN WITHOUT SCIATICA, UNSPECIFIED BACK PAIN LATERALITY, UNSPECIFIED CHRONICITY: Primary | ICD-10-CM

## 2021-05-19 DIAGNOSIS — R55 VASOVAGAL RESPONSE: ICD-10-CM

## 2021-05-19 DIAGNOSIS — F41.9 ANXIETY: ICD-10-CM

## 2021-05-19 PROCEDURE — 99212 OFFICE O/P EST SF 10 MIN: CPT | Performed by: FAMILY MEDICINE

## 2021-05-19 PROCEDURE — G8427 DOCREV CUR MEDS BY ELIG CLIN: HCPCS | Performed by: FAMILY MEDICINE

## 2021-05-19 PROCEDURE — 99213 OFFICE O/P EST LOW 20 MIN: CPT | Performed by: FAMILY MEDICINE

## 2021-05-19 PROCEDURE — G8420 CALC BMI NORM PARAMETERS: HCPCS | Performed by: FAMILY MEDICINE

## 2021-05-19 PROCEDURE — 1036F TOBACCO NON-USER: CPT | Performed by: FAMILY MEDICINE

## 2021-05-19 ASSESSMENT — ENCOUNTER SYMPTOMS
CHOKING: 0
SINUS PRESSURE: 0
DIARRHEA: 0
RHINORRHEA: 0
EYE PAIN: 0
BACK PAIN: 1
COUGH: 0
CHEST TIGHTNESS: 0
SHORTNESS OF BREATH: 0
SINUS PAIN: 0
NAUSEA: 0
ABDOMINAL PAIN: 0
EYE ITCHING: 0

## 2021-05-19 NOTE — PROGRESS NOTES
CC:  Anxiety , Low back    HPI:  23 y.o. female with PMH of low back pain and anxiety who presents for follow-up for low back pain, anxiety. Patient also recently complains of vasovagal symptoms.     Low back pain/ muscle spasms ; 2-3 days ago  Patient bent over to  a cup , which sent sharp pain up her back  On fire, dull pain  Tried to take 2 flexeril which did not help at all  Did take naproxen, and laid down ; still very soar and stiff the next day  Has been to a chiropractor ; no relief  Massages also did not help  Has never tried acupuncture    Vasovagal symptoms  - states likely secondary to limited fl;uid intake  - things infront of eyes go black ; knows its happen  - getting really hot   - sits down ; improves within a few minutes    Varicose Veins  - right calf  - some pain at vein sign when it gets swollen ; mostly when it is hot outside    Anxiety  - feeling a lot better since having moved out  - better since work hours have decreased  - no medications    Patient Active Problem List    Diagnosis Date Noted    Emotional disturbance of childhood or adolescence 01/13/2021    Sleep disorder 01/13/2021    Midline low back pain with sciatica 01/13/2021    Thoracic back pain 01/13/2021    Menorrhagia with regular cycle 01/13/2021    Depression 06/09/2019    Anxiety 06/09/2019    Encounter for pregnancy test, result unknown 06/09/2019    Birth control counseling 06/09/2019    Excessive and frequent menstruation 08/26/2016    Lumbago with sciatica 08/26/2016    Low back pain 02/02/2016       Past Medical History:   Diagnosis Date    ADHD     Anxiety     Depression     Scoliosis        Current Outpatient Medications on File Prior to Visit   Medication Sig Dispense Refill    cyclobenzaprine (FLEXERIL) 5 MG tablet Take 1 tablet by mouth 2 times daily as needed for Muscle spasms 60 tablet 1    naproxen (NAPROSYN) 500 MG tablet TAKE 1 TABLET BY MOUTH TWICE A DAY WITH MEALS 60 tablet 0     No current facility-administered medications on file prior to visit. No Known Allergies    Family History   Problem Relation Age of Onset    Cancer Maternal Grandfather     Cancer Paternal Grandmother        No past surgical history on file. Social History     Tobacco Use    Smoking status: Former Smoker     Packs/day: 0.50     Years: 7.00     Pack years: 3.50     Types: Cigarettes     Start date:      Quit date: 2021     Years since quittin.3    Smokeless tobacco: Never Used    Tobacco comment: 3-4 cigarettes   Vaping Use    Vaping Use: Never used   Substance Use Topics    Alcohol use: No     Comment: occasionally    Drug use: Yes     Types: Marijuana     Comment: occasional       ROS:    Review of Systems   Constitutional: Negative for activity change and appetite change. HENT: Negative for rhinorrhea, sinus pressure and sinus pain. Eyes: Negative for pain, itching and visual disturbance. Respiratory: Negative for cough, choking, chest tightness and shortness of breath. Cardiovascular: Negative for chest pain and leg swelling. Gastrointestinal: Negative for abdominal pain, diarrhea and nausea. Genitourinary: Negative for flank pain and pelvic pain. Musculoskeletal: Positive for back pain, neck pain and neck stiffness. Neurological: Positive for dizziness and light-headedness. Negative for syncope. Psychiatric/Behavioral: Negative for agitation and confusion. Objective:    VS:  Blood pressure (!) 100/58, pulse 80, temperature 98.7 °F (37.1 °C), temperature source Temporal, resp. rate 16, height 5' 4\" (1.626 m), weight 116 lb (52.6 kg), last menstrual period 2021, not currently breastfeeding. Physical Exam  Constitutional:       Appearance: Normal appearance. HENT:      Head: Normocephalic and atraumatic. Neck:      Vascular: No carotid bruit. Cardiovascular:      Rate and Rhythm: Normal rate and regular rhythm.       Heart sounds: Normal heart sounds. No murmur heard. No friction rub. No gallop. Pulmonary:      Effort: Pulmonary effort is normal.      Breath sounds: Normal breath sounds. Abdominal:      General: Abdomen is flat. Bowel sounds are normal.      Palpations: Abdomen is soft. Musculoskeletal:         General: No swelling or tenderness. Normal range of motion. Cervical back: No rigidity. Comments: Right posterior calf varicosity   Lymphadenopathy:      Cervical: No cervical adenopathy. Skin:     General: Skin is warm and dry. Neurological:      General: No focal deficit present. Mental Status: She is alert and oriented to person, place, and time. Mental status is at baseline. Psychiatric:         Mood and Affect: Mood normal.         Behavior: Behavior normal.         Thought Content: Thought content normal.         Judgment: Judgment normal.       Assessment/ Plan:    1. Low back pain without sciatica, unspecified back pain laterality, unspecified chronicity  Continue Flexeril  Patient given information about massotherapy ; dates she will look into it at this time  Continue Naprosyn  Continue to follow    2. Asymptomatic varicose veins  Small size varicose vein right posterior calf  Causing no symptoms at this time  Continue to monitor    3. Vasovagal response  Patient has tendency towards lower BP  Patient told to report symptoms, and triggers with which they occur  Patient to bring in trigger list next visit  Continue to monitor  Educated on importance of hydration    4. Anxiety  Patient has been off SSRI for the past month  Doing well, anxiety has been better since reducing work hours and moving in with boyfriend. Patient fuses psychotherapy at this time  Will continue to monitor    RTO 1 month or sooner prn for any persistent, new, or worsening symptoms. Please see Patient Instructions for further counseling and information given.        Advised to please be adherent to the treatment plans discussed today, and please call with any questions or concerns, letting the office know of any reasons that the plans may not be followed. The risks of untreated conditions include worsening illness, injury, disability, and possibly, death. Please call if symptoms change in any way, worsen, or fail to completely resolve, as this could necessitate a change to treatment plans. Patient and/or caregiver expressed understanding. Indications and proper use of medication(s) reviewed. Potential side-effects and risks of medication(s) also explained. Patient and/or caregiver was instructed to call if any new symptoms develop prior to next visit. Health risk factors discussed and addressed.      Electronically signed by Sidney Brumfield, LILY @  [facility] on 5/19/2021 at 3:12 PM  This case was discussed with attending physician: Dr. Danilo Law

## 2021-06-21 ENCOUNTER — OFFICE VISIT (OUTPATIENT)
Dept: FAMILY MEDICINE CLINIC | Age: 21
End: 2021-06-21
Payer: COMMERCIAL

## 2021-06-21 VITALS
OXYGEN SATURATION: 98 % | TEMPERATURE: 98.3 F | WEIGHT: 115 LBS | BODY MASS INDEX: 19.63 KG/M2 | HEART RATE: 66 BPM | DIASTOLIC BLOOD PRESSURE: 55 MMHG | SYSTOLIC BLOOD PRESSURE: 96 MMHG | HEIGHT: 64 IN

## 2021-06-21 DIAGNOSIS — G89.29 CHRONIC LOW BACK PAIN WITHOUT SCIATICA, UNSPECIFIED BACK PAIN LATERALITY: Primary | ICD-10-CM

## 2021-06-21 DIAGNOSIS — F41.9 ANXIETY: ICD-10-CM

## 2021-06-21 DIAGNOSIS — R55 VASOVAGAL NEAR-SYNCOPE: ICD-10-CM

## 2021-06-21 DIAGNOSIS — M54.50 CHRONIC LOW BACK PAIN WITHOUT SCIATICA, UNSPECIFIED BACK PAIN LATERALITY: Primary | ICD-10-CM

## 2021-06-21 PROCEDURE — G8420 CALC BMI NORM PARAMETERS: HCPCS | Performed by: FAMILY MEDICINE

## 2021-06-21 PROCEDURE — 99212 OFFICE O/P EST SF 10 MIN: CPT | Performed by: FAMILY MEDICINE

## 2021-06-21 PROCEDURE — G8427 DOCREV CUR MEDS BY ELIG CLIN: HCPCS | Performed by: FAMILY MEDICINE

## 2021-06-21 PROCEDURE — 99213 OFFICE O/P EST LOW 20 MIN: CPT | Performed by: FAMILY MEDICINE

## 2021-06-21 PROCEDURE — 1036F TOBACCO NON-USER: CPT | Performed by: FAMILY MEDICINE

## 2021-06-21 ASSESSMENT — ENCOUNTER SYMPTOMS
SHORTNESS OF BREATH: 0
ABDOMINAL PAIN: 0
NAUSEA: 0
VOMITING: 0
BACK PAIN: 0
CHEST TIGHTNESS: 0

## 2021-06-21 NOTE — PROGRESS NOTES
S: 23 y.o. female presents today for Back Pain    Back pain f/u: stable pain; controlled with stretching at home; requesting referral to chiropractor as well as massotherapy      Anxiety / depression: recently moved out of home and family was stressors; she is doing much better today off of medication    Varicose veins - noticing pain, heaviness and itching on the R calf    O: VS: BP (!) 96/55 (Site: Left Upper Arm, Position: Sitting, Cuff Size: Small Adult)   Pulse 66   Temp 98.3 °F (36.8 °C) (Temporal)   Ht 5' 4\" (1.626 m)   Wt 115 lb (52.2 kg)   LMP 05/23/2021 (Exact Date)   SpO2 98%   BMI 19.74 kg/m²   AAO/NAD, appropriate affect for mood  CV:  RRR, no murmur  Resp: CTAB  Msk: slight kyphosis on upper back; otherwise normal  Ext: R calf vv noted    Assessment/Plan:   1) Back pain f/u -  Continue with home exercises; can do chiro / massotherapy as well  2) Varicose veins - reassurance  3) Anxiety / depression - well controlled without external stressors  RTO: Return in about 1 month (around 7/21/2021) for Follow up. Attending Physician Statement  I have discussed the case, including pertinent history and exam findings with the resident. I agree with the documented assessment and plan.       Electronically signed by Adam Koenig MD on 6/22/2021 at 12:16 PM

## 2021-06-21 NOTE — PROGRESS NOTES
CC:  Monthly follow up    HPI:  23 y.o. female with pmh lower back pain, anxiety and hx depression presents to clinic for regular monthly follow up. Low Back Pain  Moderately controlled with back exercises  Patient considering chiropractor, and/or massotherapy. Was wondering if massotherapy could be covered by insurance or if a letter to be written by PCP for coverage. States it is worse in the winter, better in the summer as she feels that her muscles get stiff in the winter. Anxiety  Still having difficulty with attention  Mood is good, reduce stressors, enjoys playing with dog, states she feels really well since moving out with her boyfriend. States patient feels well off medication as well. Vasovagal Symptoms  - no dizzy spells in the last few weeks  - drinking more orange juice and water  -Feels well and will continue to monitor symptoms at this time    Health maintenance  Continues to refuse COVID-19 vaccine  Needs to receive third dose of Gardasil vaccine; states she is willing to receive vaccine next visit.     Patient Active Problem List    Diagnosis Date Noted    Emotional disturbance of childhood or adolescence 01/13/2021    Sleep disorder 01/13/2021    Midline low back pain with sciatica 01/13/2021    Thoracic back pain 01/13/2021    Menorrhagia with regular cycle 01/13/2021    Depression 06/09/2019    Anxiety 06/09/2019    Encounter for pregnancy test, result unknown 06/09/2019    Birth control counseling 06/09/2019    Excessive and frequent menstruation 08/26/2016    Lumbago with sciatica 08/26/2016    Low back pain 02/02/2016       Past Medical History:   Diagnosis Date    ADHD     Anxiety     Depression     Scoliosis        Current Outpatient Medications on File Prior to Visit   Medication Sig Dispense Refill    naproxen (NAPROSYN) 500 MG tablet TAKE 1 TABLET BY MOUTH TWICE A DAY WITH MEALS 60 tablet 0     No current facility-administered medications on file prior to Abdominal:      General: Abdomen is flat. Bowel sounds are normal. There is no distension. Palpations: Abdomen is soft. There is no mass. Musculoskeletal:         General: No swelling or tenderness. Normal range of motion. Cervical back: Normal range of motion and neck supple. No rigidity or tenderness. Comments: Mild kyphosis of thoracic spine  Normal range of motion of cervical, thoracic and lumbar spine    Skin:     General: Skin is warm and dry. Comments: Superficial varicose vein right calf   Neurological:      General: No focal deficit present. Mental Status: She is alert and oriented to person, place, and time. Psychiatric:         Mood and Affect: Mood normal.         Behavior: Behavior normal.         Thought Content: Thought content normal.         Judgment: Judgment normal.       Assessment/ Plan    1. Chronic low back pain without sciatica, unspecified back pain laterality  -Moderately controlled on lower back exercises and stretches  -Patient looking into massotherapy via his chiropractor  -Continue to monitor    2. Anxiety  -Patient doing well at new home since moving out of parental home  -Mood stable, continue to monitor    3. Vasovagal near-syncope  -No episodes in the last few weeks  -Continue to hydrate and continue to monitor     RTO 1 month or sooner prn for any persistent, new, or worsening symptoms. Please see Patient Instructions for further counseling and information given. Advised to please be adherent to the treatment plans discussed today, and please call with any questions or concerns, letting the office know of any reasons that the plans may not be followed. The risks of untreated conditions include worsening illness, injury, disability, and possibly, death. Please call if symptoms change in any way, worsen, or fail to completely resolve, as this could necessitate a change to treatment plans. Patient and/or caregiver expressed understanding. Indications and proper use of medication(s) reviewed. Potential side-effects and risks of medication(s) also explained. Patient and/or caregiver was instructed to call if any new symptoms develop prior to next visit. Health risk factors discussed and addressed.      Electronically signed by LILY Correa @  [facility] on 6/21/2021 at 11:48 AM  This case was discussed with attending physician: Dr. Kia Garvey

## 2021-07-20 ENCOUNTER — OFFICE VISIT (OUTPATIENT)
Dept: FAMILY MEDICINE CLINIC | Age: 21
End: 2021-07-20
Payer: COMMERCIAL

## 2021-07-20 VITALS
HEIGHT: 64 IN | WEIGHT: 117 LBS | DIASTOLIC BLOOD PRESSURE: 54 MMHG | SYSTOLIC BLOOD PRESSURE: 86 MMHG | HEART RATE: 68 BPM | BODY MASS INDEX: 19.97 KG/M2 | TEMPERATURE: 98.5 F | OXYGEN SATURATION: 96 %

## 2021-07-20 DIAGNOSIS — B37.9 YEAST INFECTION: ICD-10-CM

## 2021-07-20 DIAGNOSIS — L03.213 PRESEPTAL CELLULITIS OF LEFT EYE: Primary | ICD-10-CM

## 2021-07-20 DIAGNOSIS — H57.9 EYE LESION: ICD-10-CM

## 2021-07-20 PROCEDURE — 99212 OFFICE O/P EST SF 10 MIN: CPT | Performed by: FAMILY MEDICINE

## 2021-07-20 PROCEDURE — 1036F TOBACCO NON-USER: CPT | Performed by: FAMILY MEDICINE

## 2021-07-20 PROCEDURE — G8427 DOCREV CUR MEDS BY ELIG CLIN: HCPCS | Performed by: FAMILY MEDICINE

## 2021-07-20 PROCEDURE — G8420 CALC BMI NORM PARAMETERS: HCPCS | Performed by: FAMILY MEDICINE

## 2021-07-20 PROCEDURE — 99213 OFFICE O/P EST LOW 20 MIN: CPT | Performed by: FAMILY MEDICINE

## 2021-07-20 RX ORDER — ERYTHROMYCIN 20 MG/G
GEL TOPICAL 2 TIMES DAILY
Qty: 1 TUBE | Refills: 5 | Status: CANCELLED | OUTPATIENT
Start: 2021-07-20

## 2021-07-20 RX ORDER — CEFDINIR 300 MG/1
300 CAPSULE ORAL 2 TIMES DAILY
Qty: 14 CAPSULE | Refills: 0 | Status: SHIPPED | OUTPATIENT
Start: 2021-07-20 | End: 2021-07-27

## 2021-07-20 RX ORDER — SULFAMETHOXAZOLE AND TRIMETHOPRIM 800; 160 MG/1; MG/1
1 TABLET ORAL 2 TIMES DAILY
Qty: 14 TABLET | Refills: 0 | Status: SHIPPED | OUTPATIENT
Start: 2021-07-20 | End: 2021-07-27

## 2021-07-20 RX ORDER — FLUCONAZOLE 150 MG/1
150 TABLET ORAL ONCE
Qty: 1 TABLET | Refills: 0 | Status: SHIPPED | OUTPATIENT
Start: 2021-07-20 | End: 2021-07-20

## 2021-07-20 ASSESSMENT — LIFESTYLE VARIABLES
HOW OFTEN DO YOU HAVE A DRINK CONTAINING ALCOHOL: MONTHLY OR LESS
HOW MANY STANDARD DRINKS CONTAINING ALCOHOL DO YOU HAVE ON A TYPICAL DAY: 1 OR 2

## 2021-07-20 NOTE — PROGRESS NOTES
S: 23 y.o. female here for left eye concern. Two weeks ago noted red bump with trouble seeing. No photophobia now. Painful. Draining pus. Has had styes in the past.  She admits that she picked it. It is getting better. O: VS: BP (!) 86/54 (Site: Left Upper Arm, Position: Sitting, Cuff Size: Medium Adult)   Pulse 68   Temp 98.5 °F (36.9 °C) (Temporal)   Ht 5' 4\" (1.626 m)   Wt 117 lb (53.1 kg)   LMP 06/23/2021   SpO2 96%   BMI 20.08 kg/m²    General: NAD   CV:  RRR, no gallops, rubs, or murmurs   Resp: CTAB no R/R/W   Abd:  Soft, nontender, no masses    Ext:  no C/C/E   HEENT-left eyelid with edema and mild erythema as well as stye lesion on upper lid, EOMI, nl conjunctiva   Impression/Plan:   1. Eye complaint-7 days of antibiotics for preseptal cellulitis    rto in 1 week      Attending Physician Statement  I have discussed the case, including pertinent history and exam findings with the resident. I also have seen the patient and performed key portions of the examination. I agree with the documented assessment and plan.         Azra Chapman MD

## 2021-07-20 NOTE — PROGRESS NOTES
CC:  Stye    HPI:  23 y.o. female presents with pmh of anxiety presents with concern of left eye stye that has lasted over 2 weeks. Hordeolum  Started about 2 weeks ago  Was very painful , lots of itching, difficulty seeing at the time  Very watery eye ; very sensitive to light   Patient was using warm compresses at home , self drain which did help  Patient wondering if at this time, an antibiotic was a good idea . Low back pain  Chronic  Doing well, continuing exercises. No other concerns at this time. Patient Active Problem List    Diagnosis Date Noted    Emotional disturbance of childhood or adolescence 2021    Sleep disorder 2021    Midline low back pain with sciatica 2021    Thoracic back pain 2021    Menorrhagia with regular cycle 2021    Depression 2019    Anxiety 2019    Encounter for pregnancy test, result unknown 2019    Birth control counseling 2019    Excessive and frequent menstruation 2016    Lumbago with sciatica 2016    Low back pain 2016       Past Medical History:   Diagnosis Date    ADHD     Anxiety     Depression     Scoliosis        Current Outpatient Medications on File Prior to Visit   Medication Sig Dispense Refill    naproxen (NAPROSYN) 500 MG tablet TAKE 1 TABLET BY MOUTH TWICE A DAY WITH MEALS 60 tablet 0     No current facility-administered medications on file prior to visit. No Known Allergies    Family History   Problem Relation Age of Onset    Cancer Maternal Grandfather     Cancer Paternal Grandmother        No past surgical history on file.     Social History     Tobacco Use    Smoking status: Former Smoker     Packs/day: 0.50     Years: 7.00     Pack years: 3.50     Types: Cigarettes     Start date:      Quit date: 2021     Years since quittin.5    Smokeless tobacco: Never Used    Tobacco comment: 3-4 cigarettes   Vaping Use    Vaping Use: Never used   Substance Use Topics    Alcohol use: No     Comment: occasionally    Drug use: Yes     Types: Marijuana     Comment: occasional       ROS:    Review of Systems   Constitutional: Negative for activity change and appetite change. HENT: Negative for ear discharge and ear pain. Eyes: Positive for photophobia, pain, redness and visual disturbance. Negative for discharge and itching. Respiratory: Negative for chest tightness and shortness of breath. Cardiovascular: Negative for chest pain and leg swelling. Gastrointestinal: Negative for abdominal distention and abdominal pain. Genitourinary: Negative for dyspareunia and flank pain. Musculoskeletal: Negative for arthralgias, back pain and gait problem. Neurological: Negative for dizziness and light-headedness. Psychiatric/Behavioral: Negative for sleep disturbance. The patient is not nervous/anxious. Objective:    VS:  Blood pressure (!) 86/54, pulse 68, temperature 98.5 °F (36.9 °C), temperature source Temporal, height 5' 4\" (1.626 m), weight 117 lb (53.1 kg), last menstrual period 06/23/2021, SpO2 96 %, not currently breastfeeding. Physical Exam  Constitutional:       Appearance: Normal appearance. HENT:      Head: Normocephalic and atraumatic. Eyes:      General: No scleral icterus. Right eye: No discharge. Left eye: No discharge. Extraocular Movements: Extraocular movements intact. Conjunctiva/sclera: Conjunctivae normal.      Pupils: Pupils are equal, round, and reactive to light. Comments: 3-5 mm papule like lesion on left eyelid with mild erythema surround it. No inner opening  Mild crusting noted  Mildly tender to palpation    Cardiovascular:      Rate and Rhythm: Normal rate and regular rhythm. Pulses: Normal pulses. Heart sounds: Normal heart sounds. No murmur heard. No friction rub. Pulmonary:      Effort: Pulmonary effort is normal. No respiratory distress.       Breath sounds: Normal breath sounds. No stridor. Abdominal:      General: Bowel sounds are normal. There is no distension. Palpations: Abdomen is soft. Tenderness: There is no abdominal tenderness. Neurological:      General: No focal deficit present. Mental Status: She is alert and oriented to person, place, and time. Psychiatric:         Mood and Affect: Mood normal.         Behavior: Behavior normal.         Thought Content: Thought content normal.         Judgment: Judgment normal.               Assessment/ Plan    1. Preseptal cellulitis of left eye  - patient saved pictures of eye lesion in her phone. With presentation of phototoxicity, increased concern for preseptal cellulitis vs hordeolum   - refer to current media attached in chart for current picture. - patient given Bactrim DS x 7 days and Omnicef x 7 days for appropriate coverage. 2. Concern for Yeast infection with antibiotics  - patient states she gets frequent yeast infections when given antibiotics  - given diflucan preemptively     RTO 1 - 2 weeks or sooner prn for any persistent, new, or worsening symptoms. Please see Patient Instructions for further counseling and information given. Advised to please be adherent to the treatment plans discussed today, and please call with any questions or concerns, letting the office know of any reasons that the plans may not be followed. The risks of untreated conditions include worsening illness, injury, disability, and possibly, death. Please call if symptoms change in any way, worsen, or fail to completely resolve, as this could necessitate a change to treatment plans. Patient and/or caregiver expressed understanding. Indications and proper use of medication(s) reviewed. Potential side-effects and risks of medication(s) also explained. Patient and/or caregiver was instructed to call if any new symptoms develop prior to next visit. Health risk factors discussed and addressed. Electronically signed by Alix Swenson, LILY @  PGY-2 on 7/20/2021 at 2:37 PM  This case was discussed with attending physician: Dr. Oscar Santana

## 2021-07-21 ASSESSMENT — ENCOUNTER SYMPTOMS
SHORTNESS OF BREATH: 0
PHOTOPHOBIA: 1
EYE REDNESS: 1
ABDOMINAL PAIN: 0
EYE ITCHING: 0
EYE DISCHARGE: 0
EYE PAIN: 1
CHEST TIGHTNESS: 0
BACK PAIN: 0
ABDOMINAL DISTENTION: 0

## 2021-07-27 ENCOUNTER — OFFICE VISIT (OUTPATIENT)
Dept: FAMILY MEDICINE CLINIC | Age: 21
End: 2021-07-27
Payer: COMMERCIAL

## 2021-07-27 VITALS
OXYGEN SATURATION: 96 % | HEART RATE: 86 BPM | DIASTOLIC BLOOD PRESSURE: 51 MMHG | RESPIRATION RATE: 16 BRPM | HEIGHT: 64 IN | WEIGHT: 118 LBS | SYSTOLIC BLOOD PRESSURE: 107 MMHG | TEMPERATURE: 97.9 F | BODY MASS INDEX: 20.14 KG/M2

## 2021-07-27 DIAGNOSIS — L03.213 PRESEPTAL CELLULITIS OF LEFT EYE: Primary | ICD-10-CM

## 2021-07-27 PROCEDURE — G8427 DOCREV CUR MEDS BY ELIG CLIN: HCPCS | Performed by: FAMILY MEDICINE

## 2021-07-27 PROCEDURE — 99213 OFFICE O/P EST LOW 20 MIN: CPT | Performed by: FAMILY MEDICINE

## 2021-07-27 PROCEDURE — 1036F TOBACCO NON-USER: CPT | Performed by: FAMILY MEDICINE

## 2021-07-27 PROCEDURE — 99212 OFFICE O/P EST SF 10 MIN: CPT | Performed by: FAMILY MEDICINE

## 2021-07-27 PROCEDURE — G8420 CALC BMI NORM PARAMETERS: HCPCS | Performed by: FAMILY MEDICINE

## 2021-07-27 RX ORDER — CEPHALEXIN 500 MG/1
500 CAPSULE ORAL 4 TIMES DAILY
Qty: 28 CAPSULE | Refills: 0 | Status: SHIPPED | OUTPATIENT
Start: 2021-07-27 | End: 2021-08-03

## 2021-07-27 NOTE — PROGRESS NOTES
Attending Physician Statement    S:   Chief Complaint   Patient presents with   Belkismartín Gutierrez     f/u      Patient is a  23year old female with history of anxiety and depression. Here for possible preseptal cellulitis . No pain with movement of eyes. No photophobia. Vision is fine. O: Blood pressure (!) 107/51, pulse 86, temperature 97.9 °F (36.6 °C), temperature source Cerebral, resp. rate 16, height 5' 4\" (1.626 m), weight 118 lb (53.5 kg), last menstrual period 07/21/2021, SpO2 96 %, not currently breastfeeding. Exam:   Heart - RRR   Lungs - clear   Eye - see picture in chart. A: preseptal cellulitis  P:  Keflex     Eye doctor    Follow-up as ordered    Attending Attestation   I have discussed the case, including pertinent history and exam findings with the resident. I agree with the documented assessment and plan.

## 2021-07-27 NOTE — PROGRESS NOTES
CC:  Follow up on possible pre-septal cellulitis     HPI:  23 y.o. female with pmh of anxiety, depression who presents for follow up on stye/ preseptal cellulitis. Patient was seen last week for this condition and prescribed Bactrim and Omnicef Combination which she states she picked up a day or two late ; currently on day 5. States it is non tender and believes it has improved compared to prior visit. No other complaints at this time. Patient Active Problem List    Diagnosis Date Noted    Emotional disturbance of childhood or adolescence 01/13/2021    Sleep disorder 01/13/2021    Midline low back pain with sciatica 01/13/2021    Thoracic back pain 01/13/2021    Menorrhagia with regular cycle 01/13/2021    Depression 06/09/2019    Anxiety 06/09/2019    Encounter for pregnancy test, result unknown 06/09/2019    Birth control counseling 06/09/2019    Excessive and frequent menstruation 08/26/2016    Lumbago with sciatica 08/26/2016    Low back pain 02/02/2016       Past Medical History:   Diagnosis Date    ADHD     Anxiety     Depression     Scoliosis        Current Outpatient Medications on File Prior to Visit   Medication Sig Dispense Refill    sulfamethoxazole-trimethoprim (BACTRIM DS;SEPTRA DS) 800-160 MG per tablet Take 1 tablet by mouth 2 times daily for 7 days 14 tablet 0    cefdinir (OMNICEF) 300 MG capsule Take 1 capsule by mouth 2 times daily for 7 days 14 capsule 0    naproxen (NAPROSYN) 500 MG tablet TAKE 1 TABLET BY MOUTH TWICE A DAY WITH MEALS 60 tablet 0     No current facility-administered medications on file prior to visit. No Known Allergies    Family History   Problem Relation Age of Onset    Cancer Maternal Grandfather     Cancer Paternal Grandmother        No past surgical history on file.     Social History     Tobacco Use    Smoking status: Former Smoker     Packs/day: 0.50     Years: 7.00     Pack years: 3.50     Types: Cigarettes     Start date: 2014 Quit date: 2021     Years since quittin.5    Smokeless tobacco: Never Used    Tobacco comment: 3-4 cigarettes   Vaping Use    Vaping Use: Never used   Substance Use Topics    Alcohol use: No     Comment: occasionally    Drug use: Yes     Types: Marijuana     Comment: occasional       ROS:    Review of Systems   Constitutional: Negative for activity change and appetite change. HENT: Negative for congestion. Eyes: Negative for photophobia, pain, discharge, redness, itching and visual disturbance. Respiratory: Negative for apnea and chest tightness. Cardiovascular: Negative for chest pain and leg swelling. Gastrointestinal: Negative for abdominal distention and abdominal pain. Genitourinary: Negative for difficulty urinating and dyspareunia. Musculoskeletal: Negative for back pain. Neurological: Negative for dizziness, weakness and headaches. Psychiatric/Behavioral: Negative for sleep disturbance. The patient is not nervous/anxious. Objective:    VS:  Blood pressure (!) 107/51, pulse 86, temperature 97.9 °F (36.6 °C), temperature source Cerebral, resp. rate 16, height 5' 4\" (1.626 m), weight 118 lb (53.5 kg), last menstrual period 2021, SpO2 96 %, not currently breastfeeding. Physical Exam  Constitutional:       Appearance: Normal appearance. Eyes:      General: Vision grossly intact. Gaze aligned appropriately. No visual field deficit or scleral icterus. Right eye: No discharge. Left eye: No discharge. Extraocular Movements: Extraocular movements intact. Conjunctiva/sclera: Conjunctivae normal.      Pupils: Pupils are equal, round, and reactive to light. Comments: Left eye lid 3-5mm pustule with surrounding erythema. Cardiovascular:      Rate and Rhythm: Normal rate and regular rhythm. Pulses: Normal pulses. Heart sounds: No murmur heard. No friction rub. No gallop.     Pulmonary:      Effort: Pulmonary effort is normal. No

## 2021-07-29 ASSESSMENT — ENCOUNTER SYMPTOMS
ABDOMINAL PAIN: 0
EYE PAIN: 0
EYE DISCHARGE: 0
ABDOMINAL DISTENTION: 0
EYE REDNESS: 0
EYE ITCHING: 0
APNEA: 0
PHOTOPHOBIA: 0
CHEST TIGHTNESS: 0
BACK PAIN: 0

## 2021-07-29 ASSESSMENT — VISUAL ACUITY: OU: 1

## 2021-08-25 ENCOUNTER — OFFICE VISIT (OUTPATIENT)
Dept: FAMILY MEDICINE CLINIC | Age: 21
End: 2021-08-25
Payer: COMMERCIAL

## 2021-08-25 VITALS
WEIGHT: 119 LBS | SYSTOLIC BLOOD PRESSURE: 94 MMHG | TEMPERATURE: 98.6 F | HEART RATE: 68 BPM | HEIGHT: 64 IN | DIASTOLIC BLOOD PRESSURE: 55 MMHG | BODY MASS INDEX: 20.32 KG/M2 | OXYGEN SATURATION: 98 %

## 2021-08-25 DIAGNOSIS — H57.9 EYE LESION: ICD-10-CM

## 2021-08-25 PROCEDURE — G8420 CALC BMI NORM PARAMETERS: HCPCS | Performed by: FAMILY MEDICINE

## 2021-08-25 PROCEDURE — 1036F TOBACCO NON-USER: CPT | Performed by: FAMILY MEDICINE

## 2021-08-25 PROCEDURE — 99212 OFFICE O/P EST SF 10 MIN: CPT | Performed by: FAMILY MEDICINE

## 2021-08-25 PROCEDURE — 99213 OFFICE O/P EST LOW 20 MIN: CPT | Performed by: FAMILY MEDICINE

## 2021-08-25 PROCEDURE — G8427 DOCREV CUR MEDS BY ELIG CLIN: HCPCS | Performed by: FAMILY MEDICINE

## 2021-08-25 RX ORDER — FLUCONAZOLE 150 MG/1
TABLET ORAL
COMMUNITY
Start: 2021-07-20 | End: 2021-09-22

## 2021-08-25 RX ORDER — NEOMYCIN SULFATE, POLYMYXIN B SULFATE, AND DEXAMETHASONE 3.5; 10000; 1 MG/G; [USP'U]/G; MG/G
OINTMENT OPHTHALMIC
COMMUNITY
Start: 2021-07-28 | End: 2021-09-22

## 2021-08-25 NOTE — PROGRESS NOTES
CC:  Follow up for stye / concern for preseptal cellulitis    HPI:  23 y.o. female with pmh of anxiety and depression presents for stye/hordeolum followup    Eye lesion/ Stye  - Finished with antibiotics ; was previously on bactrim , keflex , cefdinir   - continuing the neosporin per optometry , has been following with optometry weekly  - Has stopped with hot compresses    HM  - amenable to HPV - next visit  - does not want covid 19 vaccine ; will trial without it    No other concerns at this time      Patient Active Problem List    Diagnosis Date Noted    Emotional disturbance of childhood or adolescence 2021    Sleep disorder 2021    Midline low back pain with sciatica 2021    Thoracic back pain 2021    Menorrhagia with regular cycle 2021    Depression 2019    Anxiety 2019    Encounter for pregnancy test, result unknown 2019    Birth control counseling 2019    Excessive and frequent menstruation 2016    Lumbago with sciatica 2016    Low back pain 2016       Past Medical History:   Diagnosis Date    ADHD     Anxiety     Depression     Scoliosis        Current Outpatient Medications on File Prior to Visit   Medication Sig Dispense Refill    naproxen (NAPROSYN) 500 MG tablet TAKE 1 TABLET BY MOUTH TWICE A DAY WITH MEALS 60 tablet 0     No current facility-administered medications on file prior to visit. No Known Allergies    Family History   Problem Relation Age of Onset    Cancer Maternal Grandfather     Cancer Paternal Grandmother        No past surgical history on file.     Social History     Tobacco Use    Smoking status: Former Smoker     Packs/day: 0.50     Years: 7.00     Pack years: 3.50     Types: Cigarettes     Start date:      Quit date: 2021     Years since quittin.6    Smokeless tobacco: Never Used    Tobacco comment: 3-4 cigarettes   Vaping Use    Vaping Use: Never used   Substance Use Topics  Alcohol use: No     Comment: occasionally    Drug use: Yes     Types: Marijuana     Comment: occasional       ROS:    Review of Systems   Constitutional: Negative for activity change, appetite change, fatigue and fever. HENT: Negative for ear discharge and ear pain. Eyes: Negative for photophobia, pain and redness. Respiratory: Negative for cough and shortness of breath. Gastrointestinal: Negative for nausea and vomiting. Genitourinary: Negative for flank pain and hematuria. Musculoskeletal: Negative for arthralgias and back pain. Neurological: Negative for dizziness and weakness. Psychiatric/Behavioral: Negative for sleep disturbance. The patient is not nervous/anxious. Objective:    VS:  Blood pressure (!) 94/55, pulse 68, temperature 98.6 °F (37 °C), temperature source Temporal, height 5' 4\" (1.626 m), weight 119 lb (54 kg), SpO2 98 %, not currently breastfeeding. Physical Exam  Constitutional:       General: She is not in acute distress. Appearance: Normal appearance. She is not toxic-appearing. HENT:      Head: Normocephalic and atraumatic. Eyes:      General: No scleral icterus. Right eye: No discharge. Left eye: No discharge. Extraocular Movements: Extraocular movements intact. Conjunctiva/sclera: Conjunctivae normal.      Pupils: Pupils are equal, round, and reactive to light. Comments: Small pustule noted on left eyelid ; improved from previous examination   Cardiovascular:      Rate and Rhythm: Normal rate and regular rhythm. Pulses: Normal pulses. Heart sounds: Normal heart sounds. No murmur heard. No friction rub. No gallop. Pulmonary:      Effort: Pulmonary effort is normal. No respiratory distress. Breath sounds: Normal breath sounds. No stridor. Abdominal:      General: Bowel sounds are normal. There is no distension. Palpations: Abdomen is soft. Tenderness: There is no abdominal tenderness. Musculoskeletal:         General: No swelling or tenderness. Normal range of motion. Skin:     General: Skin is warm and dry. Capillary Refill: Capillary refill takes less than 2 seconds. Coloration: Skin is not jaundiced. Findings: No bruising. Neurological:      General: No focal deficit present. Mental Status: She is alert and oriented to person, place, and time. Mental status is at baseline. Psychiatric:         Mood and Affect: Mood normal.         Behavior: Behavior normal.         Thought Content: Thought content normal.         Judgment: Judgment normal.           Assessment/Plan    1. Eye lesion  - continue with hot compresses , continue with neosporin  - completed course of antibiotics prescribed  - eye lesion much improved from previous  - continue to follow with optometry      RTO 1 month for HPV vaccine and anxiety/depression follow up or sooner prn for any persistent, new, or worsening symptoms. Please see Patient Instructions for further counseling and information given. Advised to please be adherent to the treatment plans discussed today, and please call with any questions or concerns, letting the office know of any reasons that the plans may not be followed. The risks of untreated conditions include worsening illness, injury, disability, and possibly, death. Please call if symptoms change in any way, worsen, or fail to completely resolve, as this could necessitate a change to treatment plans. Patient and/or caregiver expressed understanding. Indications and proper use of medication(s) reviewed. Potential side-effects and risks of medication(s) also explained. Patient and/or caregiver was instructed to call if any new symptoms develop prior to next visit. Health risk factors discussed and addressed.      Electronically signed by Sam Hogue MD PGY-2 on 8/25/2021 at 3:34 PM  This case was discussed with attending physician: Dr. Melisa Munroe

## 2021-08-25 NOTE — PROGRESS NOTES
S: 23 y.o. female presents today for:   Eye lesion, saw optometry and treated for preseptal cellulitis with oral abx and topicals. O: VS: BP (!) 94/55 (Site: Left Upper Arm, Position: Sitting, Cuff Size: Medium Adult)   Pulse 68   Temp 98.6 °F (37 °C) (Temporal)   Ht 5' 4\" (1.626 m)   Wt 119 lb (54 kg)   SpO2 98%   BMI 20.43 kg/m²   AAO/NAD, appropriate affect for mood  HEENT:  HEAD: Atraumatic, normocephalic  EYES: PERRL, EOM intact, left upper lid with improved appearance  EARS: bilateral TM's and external ear canals normal  NOSE: nasal mucosa, septum, turbinates normal bilaterally  MOUTH: mucous membranes moist and normal tonsils  NECK: supple, full range of motion, no mass, normal lymphadenopathy, no thyromegaly  CV:  RRR, no murmur  Resp: CTAB      Impression/Plan:   1) preseptal cellulitis- improved      Attending Physician Statement  I have discussed the case, including pertinent history and exam findings with the resident. I agree with the documented assessment and plan.       Kym Pereira, DO

## 2021-08-26 ASSESSMENT — ENCOUNTER SYMPTOMS
EYE PAIN: 0
VOMITING: 0
NAUSEA: 0
BACK PAIN: 0
SHORTNESS OF BREATH: 0
PHOTOPHOBIA: 0
EYE REDNESS: 0
COUGH: 0

## 2021-09-22 ENCOUNTER — OFFICE VISIT (OUTPATIENT)
Dept: FAMILY MEDICINE CLINIC | Age: 21
End: 2021-09-22
Payer: COMMERCIAL

## 2021-09-22 VITALS
BODY MASS INDEX: 20.32 KG/M2 | HEIGHT: 64 IN | SYSTOLIC BLOOD PRESSURE: 120 MMHG | TEMPERATURE: 98.6 F | RESPIRATION RATE: 12 BRPM | DIASTOLIC BLOOD PRESSURE: 79 MMHG | HEART RATE: 80 BPM | WEIGHT: 119 LBS | OXYGEN SATURATION: 97 %

## 2021-09-22 DIAGNOSIS — Z23 NEED FOR HPV VACCINE: Primary | ICD-10-CM

## 2021-09-22 DIAGNOSIS — Z23 NEED FOR TDAP VACCINATION: ICD-10-CM

## 2021-09-22 PROCEDURE — 6360000002 HC RX W HCPCS

## 2021-09-22 PROCEDURE — 1036F TOBACCO NON-USER: CPT | Performed by: FAMILY MEDICINE

## 2021-09-22 PROCEDURE — 90715 TDAP VACCINE 7 YRS/> IM: CPT

## 2021-09-22 PROCEDURE — G8420 CALC BMI NORM PARAMETERS: HCPCS | Performed by: FAMILY MEDICINE

## 2021-09-22 PROCEDURE — 99213 OFFICE O/P EST LOW 20 MIN: CPT | Performed by: FAMILY MEDICINE

## 2021-09-22 PROCEDURE — 2500000003 HC RX 250 WO HCPCS

## 2021-09-22 PROCEDURE — 99212 OFFICE O/P EST SF 10 MIN: CPT | Performed by: FAMILY MEDICINE

## 2021-09-22 PROCEDURE — 90471 IMMUNIZATION ADMIN: CPT

## 2021-09-22 PROCEDURE — G8427 DOCREV CUR MEDS BY ELIG CLIN: HCPCS | Performed by: FAMILY MEDICINE

## 2021-09-22 NOTE — PROGRESS NOTES
CC:  Follow up Stye , follow up anxiety    HPI:  19 y.o. female with pmh of stye (with previous concerns for preseptal cellulitis and completion of antibiotic course), anxiety/depression presents to clinic for stye follow up and request for HPV vaccine. Stye  - patient states that the stye is much improved from last visit  - she has stopped using neosporin and has completed antibiotic courses previously prescribed  - no photosensitivity, drainage or tenderness at this time. Hx of Anxiety  - patient still managing well off meds  - just recently bought a dog   - doing well overall with no current concerns    HM  - patient interested in HPV vaccine today. No other concerns at this time. Patient Active Problem List    Diagnosis Date Noted    Emotional disturbance of childhood or adolescence 01/13/2021    Sleep disorder 01/13/2021    Midline low back pain with sciatica 01/13/2021    Thoracic back pain 01/13/2021    Menorrhagia with regular cycle 01/13/2021    Depression 06/09/2019    Anxiety 06/09/2019    Encounter for pregnancy test, result unknown 06/09/2019    Birth control counseling 06/09/2019    Excessive and frequent menstruation 08/26/2016    Lumbago with sciatica 08/26/2016    Low back pain 02/02/2016       Past Medical History:   Diagnosis Date    ADHD     Anxiety     Depression     Scoliosis        Current Outpatient Medications on File Prior to Visit   Medication Sig Dispense Refill    fluconazole (DIFLUCAN) 150 MG tablet TAKE 1 TABLET BY MOUTH EVERY DAY AS ONE DOSE      neomycin-polymyxin-dexameth [id number] OINT APPLY A SMALL AMOUNT OF OINTMENT 4 TIMES DAILY TO RIGHT EYE.  naproxen (NAPROSYN) 500 MG tablet TAKE 1 TABLET BY MOUTH TWICE A DAY WITH MEALS 60 tablet 0     No current facility-administered medications on file prior to visit.        No Known Allergies    Family History   Problem Relation Age of Onset    Cancer Maternal Grandfather     Cancer Paternal Grandmother        No past surgical history on file. Social History     Tobacco Use    Smoking status: Former Smoker     Packs/day: 0.50     Years: 7.00     Pack years: 3.50     Types: Cigarettes     Start date:      Quit date: 2021     Years since quittin.7    Smokeless tobacco: Never Used    Tobacco comment: 3-4 cigarettes   Vaping Use    Vaping Use: Never used   Substance Use Topics    Alcohol use: No     Comment: occasionally    Drug use: Yes     Types: Marijuana     Comment: occasional       ROS:    Review of Systems   Constitutional: Negative for activity change and appetite change. Eyes: Negative for photophobia, pain, discharge, redness and visual disturbance. Respiratory: Negative for cough and shortness of breath. Cardiovascular: Negative for chest pain and palpitations. Gastrointestinal: Negative for constipation and diarrhea. Genitourinary: Negative for difficulty urinating and frequency. Musculoskeletal: Negative for arthralgias and joint swelling. Neurological: Negative for dizziness and weakness. Psychiatric/Behavioral: Negative for sleep disturbance. The patient is not nervous/anxious. Objective:    VS:  Blood pressure 120/79, pulse 80, temperature 98.6 °F (37 °C), temperature source Temporal, resp. rate 12, height 5' 4\" (1.626 m), weight 119 lb (54 kg), last menstrual period 2021, SpO2 97 %, not currently breastfeeding. Physical Exam  Constitutional:       Appearance: Normal appearance. Eyes:      General: No scleral icterus. Right eye: No discharge. Left eye: No discharge. Extraocular Movements: Extraocular movements intact. Conjunctiva/sclera: Conjunctivae normal.      Pupils: Pupils are equal, round, and reactive to light. Cardiovascular:      Rate and Rhythm: Normal rate and regular rhythm. Pulses: Normal pulses. Heart sounds: Normal heart sounds. No murmur heard. No friction rub. No gallop. Pulmonary:      Effort: Pulmonary effort is normal. No respiratory distress. Breath sounds: Normal breath sounds. No wheezing. Abdominal:      General: Bowel sounds are normal.      Palpations: Abdomen is soft. Tenderness: There is no abdominal tenderness. There is no guarding. Musculoskeletal:         General: No swelling or tenderness. Normal range of motion. Skin:     General: Skin is warm. Coloration: Skin is not jaundiced. Findings: No bruising. Neurological:      Mental Status: She is alert and oriented to person, place, and time. Psychiatric:         Mood and Affect: Mood normal.         Behavior: Behavior normal.         Thought Content: Thought content normal.         Judgment: Judgment normal.         Assessment/Plan    1. Need for HPV vaccine  - HPV 9-valent recomb vaccine (GARDASIL) injection 0.5 mL given    2. Need for Tdap vaccination  - Tetanus-Diphth-Acell Pertussis (BOOSTRIX) injection 0.5 mL given     RTO 1 month or sooner prn for any persistent, new, or worsening symptoms. Please see Patient Instructions for further counseling and information given. Advised to please be adherent to the treatment plans discussed today, and please call with any questions or concerns, letting the office know of any reasons that the plans may not be followed. The risks of untreated conditions include worsening illness, injury, disability, and possibly, death. Please call if symptoms change in any way, worsen, or fail to completely resolve, as this could necessitate a change to treatment plans. Patient and/or caregiver expressed understanding. Indications and proper use of medication(s) reviewed. Potential side-effects and risks of medication(s) also explained. Patient and/or caregiver was instructed to call if any new symptoms develop prior to next visit. Health risk factors discussed and addressed.      Electronically signed by Rosalee Carmona MD PGY-2 on 9/22/2021 at 1:04 PM  This case was discussed with attending physician: Dr. Dhiraj Gonzalez

## 2021-09-22 NOTE — PROGRESS NOTES
S: 19 y.o. female presents today for:   Stye present for several months. No pain or visual disturbance. Finished abx. Infection has resolved. Due for HPV #3 and Tdap. O: VS: /79 (Site: Left Upper Arm, Position: Sitting, Cuff Size: Medium Adult)   Pulse 80   Temp 98.6 °F (37 °C) (Temporal)   Resp 12   Ht 5' 4\" (1.626 m)   Wt 119 lb (54 kg)   LMP 09/17/2021   SpO2 97%   BMI 20.43 kg/m²   AAO/NAD, appropriate affect for mood  CV:  RRR, no murmur  Resp: CTAB  Abdomen: Soft, non-tender, non-distended, positive BS in all 4 quadrants      Impression/Plan:   1) stye- resolved  2) HM- HPV #3, Tdap    Attending Physician Statement  I have discussed the case, including pertinent history and exam findings with the resident. I agree with the documented assessment and plan.       Kym Pereira, DO

## 2021-09-23 ASSESSMENT — ENCOUNTER SYMPTOMS
DIARRHEA: 0
PHOTOPHOBIA: 0
EYE REDNESS: 0
EYE DISCHARGE: 0
EYE PAIN: 0
COUGH: 0
CONSTIPATION: 0
SHORTNESS OF BREATH: 0

## 2021-11-03 ENCOUNTER — OFFICE VISIT (OUTPATIENT)
Dept: FAMILY MEDICINE CLINIC | Age: 21
End: 2021-11-03
Payer: COMMERCIAL

## 2021-11-03 VITALS
SYSTOLIC BLOOD PRESSURE: 113 MMHG | WEIGHT: 121 LBS | HEIGHT: 64 IN | HEART RATE: 74 BPM | BODY MASS INDEX: 20.66 KG/M2 | OXYGEN SATURATION: 97 % | TEMPERATURE: 98.2 F | DIASTOLIC BLOOD PRESSURE: 63 MMHG | RESPIRATION RATE: 18 BRPM

## 2021-11-03 DIAGNOSIS — F41.9 ANXIETY: ICD-10-CM

## 2021-11-03 DIAGNOSIS — M54.50 LOW BACK PAIN WITHOUT SCIATICA, UNSPECIFIED BACK PAIN LATERALITY, UNSPECIFIED CHRONICITY: Primary | ICD-10-CM

## 2021-11-03 PROCEDURE — G8427 DOCREV CUR MEDS BY ELIG CLIN: HCPCS | Performed by: FAMILY MEDICINE

## 2021-11-03 PROCEDURE — 1036F TOBACCO NON-USER: CPT | Performed by: FAMILY MEDICINE

## 2021-11-03 PROCEDURE — 99213 OFFICE O/P EST LOW 20 MIN: CPT | Performed by: FAMILY MEDICINE

## 2021-11-03 PROCEDURE — G8484 FLU IMMUNIZE NO ADMIN: HCPCS | Performed by: FAMILY MEDICINE

## 2021-11-03 PROCEDURE — G8420 CALC BMI NORM PARAMETERS: HCPCS | Performed by: FAMILY MEDICINE

## 2021-11-03 RX ORDER — NAPROXEN 500 MG/1
500 TABLET ORAL 2 TIMES DAILY WITH MEALS
Qty: 60 TABLET | Refills: 0 | Status: SHIPPED
Start: 2021-11-03 | End: 2022-01-24

## 2021-11-03 NOTE — PROGRESS NOTES
S: 19 y.o. female here for scoliosis, depression, anxiety. Naproxen for back pain  Off meds. Stress improved since moving out of parent's house. Got new job, very happy. O: VS: /63 (Site: Left Upper Arm, Position: Sitting, Cuff Size: Medium Adult)   Pulse 74   Temp 98.2 °F (36.8 °C) (Temporal)   Resp 18   Ht 5' 4\" (1.626 m)   Wt 121 lb (54.9 kg)   LMP 10/12/2021   SpO2 97%   BMI 20.77 kg/m²    General: NAD, alert and interacting appropriately. CV:  RRR, no gallops, rubs, or murmurs    Resp: CTAB   Psych: normal affect    Impression: scoliosis, depression, anxiety. Plan:   Naproxen prn   CTM depression and anxiety    Attending Physician Statement  I have discussed the case, including pertinent history and exam findings with the resident. I agree with the documented assessment and plan.

## 2021-11-03 NOTE — PROGRESS NOTES
CC: Follow-up anxiety, scoliosis    HPI:  19 y.o. female with PMH of anxiety, scoliosis who presents for regular follow-up. History of Scoliosis  Patient has usual complaints of body aches due to history of scoliosis that worsen with the winter. States that the cold makes her joints ache  Patient is working well with naproxen and back exercises are helping  Patient has recently started using liquid soap/muscle relaxers by a company called Dr. Jesus Holder; states that the to bubble baths which are helping patient a lot and feeling relaxed and dealing with her back pain    Change in appetite  Patient has noted that foods that she used to love to ER now things that she does not want to eat at this time. Notes that she loves eating mac & cheese but cannot tolerate too much mac & cheese anymore  Otherwise states that she is tolerating a diet appropriately and is eating other foods. Believes this may be due to eating mac & cheese too frequently, thinks that she should give it a break and try again later. 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       HM  Declining flu vaccine and covid 19 vaccine    No other complaints at this time    Patient Active Problem List    Diagnosis Date Noted    Emotional disturbance of childhood or adolescence 01/13/2021    Sleep disorder 01/13/2021    Midline low back pain with sciatica 01/13/2021    Thoracic back pain 01/13/2021    Menorrhagia with regular cycle 01/13/2021    Depression 06/09/2019    Anxiety 2019    Encounter for pregnancy test, result unknown 2019    Birth control counseling 2019    Excessive and frequent menstruation 2016    Lumbago with sciatica 2016    Low back pain 2016       Past Medical History:   Diagnosis Date    ADHD     Anxiety     Depression     Scoliosis        Current Outpatient Medications on File Prior to Visit   Medication Sig Dispense Refill    naproxen (NAPROSYN) 500 MG tablet TAKE 1 TABLET BY MOUTH TWICE A DAY WITH MEALS 60 tablet 0     No current facility-administered medications on file prior to visit. No Known Allergies    Family History   Problem Relation Age of Onset    Cancer Maternal Grandfather     Cancer Paternal Grandmother        No past surgical history on file. Social History     Tobacco Use    Smoking status: Former Smoker     Packs/day: 0.50     Years: 7.00     Pack years: 3.50     Types: Cigarettes     Start date:      Quit date: 2021     Years since quittin.8    Smokeless tobacco: Never Used    Tobacco comment: 3-4 cigarettes   Vaping Use    Vaping Use: Never used   Substance Use Topics    Alcohol use: No     Comment: occasionally    Drug use: Yes     Types: Marijuana (Weed)     Comment: occasional       ROS:    Review of Systems   Constitutional: Negative for fatigue and fever. Eyes: Negative for pain and redness. Respiratory: Negative for chest tightness and shortness of breath. Cardiovascular: Negative for chest pain and palpitations. Gastrointestinal: Negative for abdominal distention and constipation. Genitourinary: Negative for difficulty urinating. Musculoskeletal: Positive for back pain. Negative for gait problem. Neurological: Negative for dizziness and weakness. Psychiatric/Behavioral: Negative for sleep disturbance. All other systems reviewed and are negative.     Objective:    VS:  Blood pressure 113/63, pulse 74, temperature 98.2 °F (36.8 °C), temperature source Temporal, resp. rate 18, height 5' 4\" (1.626 m), weight 121 lb (54.9 kg), last menstrual period 10/12/2021, SpO2 97 %, not currently breastfeeding. Physical Exam  Constitutional:       Appearance: Normal appearance. Cardiovascular:      Rate and Rhythm: Normal rate and regular rhythm. Pulses: Normal pulses. Heart sounds: Normal heart sounds. No murmur heard. No friction rub. Neurological:      Mental Status: She is alert. Psychiatric:         Mood and Affect: Mood normal.         Behavior: Behavior normal.         Thought Content: Thought content normal.         Judgment: Judgment normal.       Assessment/Plan    1. Low back pain without sciatica, unspecified back pain laterality, unspecified chronicity  Continue with lower back exercises at this time  Continue with hot water baths, muscle relaxing bath  Refill naproxen for low back pain as needed    2. Anxiety  Continue off meds  Patient stable, good mood normal affect  We will continue to monitor     RTO 1 month or sooner prn for any persistent, new, or worsening symptoms. Please see Patient Instructions for further counseling and information given. Advised to please be adherent to the treatment plans discussed today, and please call with any questions or concerns, letting the office know of any reasons that the plans may not be followed. The risks of untreated conditions include worsening illness, injury, disability, and possibly, death. Please call if symptoms change in any way, worsen, or fail to completely resolve, as this could necessitate a change to treatment plans. Patient and/or caregiver expressed understanding. Indications and proper use of medication(s) reviewed. Potential side-effects and risks of medication(s) also explained. Patient and/or caregiver was instructed to call if any new symptoms develop prior to next visit. Health risk factors discussed and addressed.      Electronically signed by Laura Courtney MD PGY-2 on 11/3/2021 at 4:24 PM  This case was discussed with attending physician: Dr. Teresa Collier

## 2021-11-04 RX ORDER — CETIRIZINE HYDROCHLORIDE 10 MG/1
TABLET ORAL
COMMUNITY
Start: 2021-10-03 | End: 2022-02-09

## 2021-11-04 ASSESSMENT — ENCOUNTER SYMPTOMS
SHORTNESS OF BREATH: 0
EYE PAIN: 0
CHEST TIGHTNESS: 0
CONSTIPATION: 0
ABDOMINAL DISTENTION: 0
EYE REDNESS: 0
BACK PAIN: 1

## 2021-12-24 ENCOUNTER — APPOINTMENT (OUTPATIENT)
Dept: GENERAL RADIOLOGY | Age: 21
End: 2021-12-24
Payer: COMMERCIAL

## 2021-12-24 ENCOUNTER — HOSPITAL ENCOUNTER (EMERGENCY)
Age: 21
Discharge: HOME | End: 2021-12-24
Payer: COMMERCIAL

## 2021-12-24 VITALS
RESPIRATION RATE: 18 BRPM | OXYGEN SATURATION: 100 % | WEIGHT: 120 LBS | SYSTOLIC BLOOD PRESSURE: 131 MMHG | BODY MASS INDEX: 20.6 KG/M2 | DIASTOLIC BLOOD PRESSURE: 71 MMHG | TEMPERATURE: 97.1 F | HEART RATE: 86 BPM

## 2021-12-24 DIAGNOSIS — M54.6 ACUTE RIGHT-SIDED THORACIC BACK PAIN: Primary | ICD-10-CM

## 2021-12-24 LAB
ANION GAP SERPL CALCULATED.3IONS-SCNC: 13 MMOL/L (ref 7–16)
BASOPHILS ABSOLUTE: 0.03 E9/L (ref 0–0.2)
BASOPHILS RELATIVE PERCENT: 0.5 % (ref 0–2)
BUN BLDV-MCNC: 12 MG/DL (ref 6–20)
CALCIUM SERPL-MCNC: 9.5 MG/DL (ref 8.6–10.2)
CHLORIDE BLD-SCNC: 103 MMOL/L (ref 98–107)
CO2: 25 MMOL/L (ref 22–29)
CREAT SERPL-MCNC: 0.6 MG/DL (ref 0.5–1)
D DIMER: <200 NG/ML DDU
EOSINOPHILS ABSOLUTE: 0.11 E9/L (ref 0.05–0.5)
EOSINOPHILS RELATIVE PERCENT: 1.7 % (ref 0–6)
GFR AFRICAN AMERICAN: >60
GFR NON-AFRICAN AMERICAN: >60 ML/MIN/1.73
GLUCOSE BLD-MCNC: 88 MG/DL (ref 74–99)
HCG, URINE, POC: NEGATIVE
HCT VFR BLD CALC: 42.3 % (ref 34–48)
HEMOGLOBIN: 14.3 G/DL (ref 11.5–15.5)
IMMATURE GRANULOCYTES #: 0.01 E9/L
IMMATURE GRANULOCYTES %: 0.2 % (ref 0–5)
LYMPHOCYTES ABSOLUTE: 1.76 E9/L (ref 1.5–4)
LYMPHOCYTES RELATIVE PERCENT: 27 % (ref 20–42)
Lab: NORMAL
MCH RBC QN AUTO: 30.2 PG (ref 26–35)
MCHC RBC AUTO-ENTMCNC: 33.8 % (ref 32–34.5)
MCV RBC AUTO: 89.4 FL (ref 80–99.9)
MONOCYTES ABSOLUTE: 0.5 E9/L (ref 0.1–0.95)
MONOCYTES RELATIVE PERCENT: 7.7 % (ref 2–12)
NEGATIVE QC PASS/FAIL: NORMAL
NEUTROPHILS ABSOLUTE: 4.1 E9/L (ref 1.8–7.3)
NEUTROPHILS RELATIVE PERCENT: 62.9 % (ref 43–80)
PDW BLD-RTO: 11.9 FL (ref 11.5–15)
PLATELET # BLD: 202 E9/L (ref 130–450)
PMV BLD AUTO: 9.5 FL (ref 7–12)
POSITIVE QC PASS/FAIL: NORMAL
POTASSIUM REFLEX MAGNESIUM: 4.1 MMOL/L (ref 3.5–5)
RBC # BLD: 4.73 E12/L (ref 3.5–5.5)
SODIUM BLD-SCNC: 141 MMOL/L (ref 132–146)
WBC # BLD: 6.5 E9/L (ref 4.5–11.5)

## 2021-12-24 PROCEDURE — 6360000002 HC RX W HCPCS: Performed by: NURSE PRACTITIONER

## 2021-12-24 PROCEDURE — 72072 X-RAY EXAM THORAC SPINE 3VWS: CPT

## 2021-12-24 PROCEDURE — 85025 COMPLETE CBC W/AUTO DIFF WBC: CPT

## 2021-12-24 PROCEDURE — 85378 FIBRIN DEGRADE SEMIQUANT: CPT

## 2021-12-24 PROCEDURE — 6370000000 HC RX 637 (ALT 250 FOR IP): Performed by: NURSE PRACTITIONER

## 2021-12-24 PROCEDURE — 96374 THER/PROPH/DIAG INJ IV PUSH: CPT

## 2021-12-24 PROCEDURE — 99283 EMERGENCY DEPT VISIT LOW MDM: CPT

## 2021-12-24 PROCEDURE — 72040 X-RAY EXAM NECK SPINE 2-3 VW: CPT

## 2021-12-24 PROCEDURE — 71046 X-RAY EXAM CHEST 2 VIEWS: CPT

## 2021-12-24 PROCEDURE — 80048 BASIC METABOLIC PNL TOTAL CA: CPT

## 2021-12-24 PROCEDURE — 2580000003 HC RX 258: Performed by: NURSE PRACTITIONER

## 2021-12-24 RX ORDER — KETOROLAC TROMETHAMINE 30 MG/ML
30 INJECTION, SOLUTION INTRAMUSCULAR; INTRAVENOUS ONCE
Status: COMPLETED | OUTPATIENT
Start: 2021-12-24 | End: 2021-12-24

## 2021-12-24 RX ORDER — CYCLOBENZAPRINE HCL 10 MG
5 TABLET ORAL ONCE
Status: COMPLETED | OUTPATIENT
Start: 2021-12-24 | End: 2021-12-24

## 2021-12-24 RX ORDER — PREDNISONE 20 MG/1
20 TABLET ORAL 2 TIMES DAILY
Qty: 10 TABLET | Refills: 0 | Status: SHIPPED | OUTPATIENT
Start: 2021-12-24 | End: 2021-12-29

## 2021-12-24 RX ORDER — CYCLOBENZAPRINE HCL 5 MG
5 TABLET ORAL 3 TIMES DAILY PRN
COMMUNITY
End: 2021-12-24

## 2021-12-24 RX ORDER — CYCLOBENZAPRINE HCL 5 MG
5 TABLET ORAL 2 TIMES DAILY PRN
Qty: 10 TABLET | Refills: 0 | Status: SHIPPED | OUTPATIENT
Start: 2021-12-24 | End: 2021-12-27 | Stop reason: SDUPTHER

## 2021-12-24 RX ORDER — 0.9 % SODIUM CHLORIDE 0.9 %
1000 INTRAVENOUS SOLUTION INTRAVENOUS ONCE
Status: COMPLETED | OUTPATIENT
Start: 2021-12-24 | End: 2021-12-24

## 2021-12-24 RX ADMIN — KETOROLAC TROMETHAMINE 30 MG: 30 INJECTION, SOLUTION INTRAMUSCULAR; INTRAVENOUS at 11:34

## 2021-12-24 RX ADMIN — SODIUM CHLORIDE 1000 ML: 9 INJECTION, SOLUTION INTRAVENOUS at 11:35

## 2021-12-24 RX ADMIN — CYCLOBENZAPRINE 5 MG: 10 TABLET, FILM COATED ORAL at 12:41

## 2021-12-24 ASSESSMENT — PAIN DESCRIPTION - ORIENTATION: ORIENTATION: RIGHT

## 2021-12-24 ASSESSMENT — PAIN DESCRIPTION - PAIN TYPE: TYPE: ACUTE PAIN

## 2021-12-24 ASSESSMENT — PAIN SCALES - GENERAL
PAINLEVEL_OUTOF10: 10
PAINLEVEL_OUTOF10: 10

## 2021-12-24 ASSESSMENT — PAIN DESCRIPTION - LOCATION: LOCATION: SHOULDER

## 2021-12-24 NOTE — ED PROVIDER NOTES
[facility]    Department of Emergency Medicine   ED  Provider Note  Admit Date/RoomTime: 12/24/2021 10:08 AM  ED Room: 23/23 12/24/21  10:36 AM EST    History of Present Illness:   Ghada Arshad is a 19 y.o. female presenting to the ED for right posterior thoracic rib cage pain which started 5 days ago. The complaint has been intermittent, moderate in severity, and worsened by movement, palpation, or inspiration. Patient reports that she has a history of scoliosis which causes back pain for her however she is never felt pain like this before. She states that she was taking Flexeril at home for the pain which did help, however she ran out of pills. She denies any injury or trauma. She states that the pain is so intense that it causes her to feel short of breath at times. She denies any chest pain, fever, chills, abdominal pain, nausea, vomiting. Patient denies any lumbar back pain. She is ambulatory. She denies any numbness or tingling. Pain does not radiate down the extremities. She denies any groin or saddle paresthesia, IV drug use, or muscle weakness. PERC Rule for PE for Age <50:      Age ? 50 negative     HR ? 100 negative     O2 Sat on Room Air < 95% negative     Prior History of DVT/PE negative     Recent Trauma or Surgery negative     Hemoptysis negative     Exogenous Estrogen/Hormone Use positive     Unilateral Extgremity Swelling negative     * If ANY Criteria are positive, the PERC rule is not satisfied and cannot be used to rule out PE in this patient. Review of Systems:   A complete review of systems was performed and pertinent positives and negatives are stated within HPI, all other systems reviewed and are negative.          --------------------------------------------- PAST HISTORY ---------------------------------------------  Past Medical History:  has a past medical history of ADHD, Anxiety, Depression, and Scoliosis.     Past Surgical History:  has no past surgical history on file. Social History:  reports that she has quit smoking. Her smoking use included cigarettes. She started smoking about 7 years ago. She has a 3.50 pack-year smoking history. She has never used smokeless tobacco. She reports current drug use. Drug: Marijuana Yadav Krystle). She reports that she does not drink alcohol. Family History: family history includes Cancer in her maternal grandfather and paternal grandmother. Unless otherwise noted, family history is non contributory    The patients home medications have been reviewed. Allergies: Patient has no known allergies.     -------------------------------------------------- RESULTS -------------------------------------------------  All laboratory and radiology results have been personally reviewed by myself   LABS:  Results for orders placed or performed during the hospital encounter of 12/24/21   CBC Auto Differential   Result Value Ref Range    WBC 6.5 4.5 - 11.5 E9/L    RBC 4.73 3.50 - 5.50 E12/L    Hemoglobin 14.3 11.5 - 15.5 g/dL    Hematocrit 42.3 34.0 - 48.0 %    MCV 89.4 80.0 - 99.9 fL    MCH 30.2 26.0 - 35.0 pg    MCHC 33.8 32.0 - 34.5 %    RDW 11.9 11.5 - 15.0 fL    Platelets 806 051 - 307 E9/L    MPV 9.5 7.0 - 12.0 fL    Neutrophils % 62.9 43.0 - 80.0 %    Immature Granulocytes % 0.2 0.0 - 5.0 %    Lymphocytes % 27.0 20.0 - 42.0 %    Monocytes % 7.7 2.0 - 12.0 %    Eosinophils % 1.7 0.0 - 6.0 %    Basophils % 0.5 0.0 - 2.0 %    Neutrophils Absolute 4.10 1.80 - 7.30 E9/L    Immature Granulocytes # 0.01 E9/L    Lymphocytes Absolute 1.76 1.50 - 4.00 E9/L    Monocytes Absolute 0.50 0.10 - 0.95 E9/L    Eosinophils Absolute 0.11 0.05 - 0.50 E9/L    Basophils Absolute 0.03 0.00 - 0.20 E9/L   D-Dimer, Quantitative   Result Value Ref Range    D-Dimer, Quant <200 ng/mL DDU   Basic Metabolic Panel w/ Reflex to MG   Result Value Ref Range    Sodium 141 132 - 146 mmol/L    Potassium reflex Magnesium 4.1 3.5 - 5.0 mmol/L    Chloride 103 98 - 107 mmol/L CO2 25 22 - 29 mmol/L    Anion Gap 13 7 - 16 mmol/L    Glucose 88 74 - 99 mg/dL    BUN 12 6 - 20 mg/dL    CREATININE 0.6 0.5 - 1.0 mg/dL    GFR Non-African American >60 >=60 mL/min/1.73    GFR African American >60     Calcium 9.5 8.6 - 10.2 mg/dL   POC Pregnancy Urine   Result Value Ref Range    HCG, Urine, POC Negative Negative    Lot Number [id number]     Positive QC Pass/Fail Pass     Negative QC Pass/Fail Pass        RADIOLOGY:  Interpreted by Radiologist.  XR CERVICAL SPINE (2-3 VIEWS)   Final Result   Mild degenerative disc disease at C3-4 without acute bony abnormality. XR THORACIC SPINE (3 VIEWS)   Final Result   Multilevel degenerative disc disease in the thoracic spine without acute bony   abnormality. XR CHEST (2 VW)   Final Result   No active cardiopulmonary disease.             ------------------------- NURSING NOTES AND VITALS REVIEWED ---------------------------   The nursing notes within the ED encounter and vital signs as below have been reviewed. /71   Pulse 86   Temp 97.1 °F (36.2 °C) (Tympanic)   Resp 18   Wt 120 lb (54.4 kg)   LMP 12/10/2021   SpO2 100%   BMI 20.60 kg/m²   Oxygen Saturation Interpretation: Normal      ---------------------------------------------------PHYSICAL EXAM--------------------------------------    Constitutional/General: Alert and oriented x3, well appearing, non toxic in NAD, pleasant, calm, cooperative  Head: Normocephalic and atraumatic  Eyes: PERRL, EOMI, conjunctiva normal, sclera non icteric, no eye drainage  Mouth: Oropharynx clear, without erythema, handling secretions, no trismus, no asymmetry of the posterior oropharynx or uvular edema. No tongue/lip swelling. Neck: Supple, full ROM, no stridor, no crepitus, no meningeal signs. No lymphadenopathy. Respiratory: Lungs clear to auscultation bilaterally, no wheezes, rales, or rhonchi. Not in respiratory distress. Respirations easy and unlabored. Cardiovascular:  S1S2. Regular rate. Regular rhythm. No murmurs, gallops, or rubs. 2+ distal pulses  Chest: Right thoracic posterior chest wall tenderness with palpation, no flail chest.  No step-off or deformity. GI:  Abdomen Soft, Non tender, Non distended. +BS x 4 quadrants. No organomegaly, no palpable masses,  No rebound, guarding, or rigidity. Musculoskeletal: Moves all extremities x 4. Warm and well perfused, no clubbing, cyanosis, or edema. Capillary refill <3 seconds. Patient complains of generalized thoracic and cervical spine pain which radiates to the right posterior thoracic rib cage. Integument: skin warm and dry. No rashes. Lymphatic: no lymphadenopathy noted  Neurologic: GCS 15, no focal deficits, symmetric strength 5/5 in the upper and lower extremities bilaterally  Psychiatric: Normal Affect      ------------------------------ ED COURSE/MEDICAL DECISION MAKING----------------------  Medications   0.9 % sodium chloride bolus (0 mLs IntraVENous Stopped 12/24/21 1237)   ketorolac (TORADOL) injection 30 mg (30 mg IntraVENous Given by Other 12/24/21 1134)   cyclobenzaprine (FLEXERIL) tablet 5 mg (5 mg Oral Given 12/24/21 1241)         Medical Decision Making:    Patient is a 19year old female presenting to the ED today for evaluation of right sided thoracic back pain x 5 days. Patient reports no injury or trauma. States that at home she was managing the pain with Flexeril which helped but she ran out of the medication. Patient has a history of chronic back pain. Physical examination with vague thoracic paraspinal and rib cage pain with palpation, no obvious deformity. Vital signs WNL. Labs obtained including d dimer which are negative for acute finding. Patient had moderate relief of pain with ketorolac and flexeril administration while in ED. She is comfortable with discharge to home with flexeril and continue motrin/tylenol for pain if needed. Patient to follow up with pcp for evaluation and management.  Patient educated on

## 2021-12-27 RX ORDER — CYCLOBENZAPRINE HCL 5 MG
5 TABLET ORAL 2 TIMES DAILY PRN
Qty: 10 TABLET | Refills: 0 | Status: SHIPPED | OUTPATIENT
Start: 2021-12-27 | End: 2022-01-06

## 2022-01-10 ENCOUNTER — OFFICE VISIT (OUTPATIENT)
Dept: FAMILY MEDICINE CLINIC | Age: 22
End: 2022-01-10
Payer: COMMERCIAL

## 2022-01-10 VITALS
WEIGHT: 120 LBS | BODY MASS INDEX: 20.49 KG/M2 | HEIGHT: 64 IN | DIASTOLIC BLOOD PRESSURE: 73 MMHG | SYSTOLIC BLOOD PRESSURE: 119 MMHG | OXYGEN SATURATION: 97 % | TEMPERATURE: 99.1 F | RESPIRATION RATE: 18 BRPM | HEART RATE: 70 BPM

## 2022-01-10 DIAGNOSIS — J06.9 VIRAL URI: Primary | ICD-10-CM

## 2022-01-10 DIAGNOSIS — J06.9 VIRAL URI: ICD-10-CM

## 2022-01-10 DIAGNOSIS — Z20.822 EXPOSURE TO COVID-19 VIRUS: ICD-10-CM

## 2022-01-10 PROCEDURE — 4004F PT TOBACCO SCREEN RCVD TLK: CPT | Performed by: FAMILY MEDICINE

## 2022-01-10 PROCEDURE — 99213 OFFICE O/P EST LOW 20 MIN: CPT | Performed by: FAMILY MEDICINE

## 2022-01-10 PROCEDURE — G8484 FLU IMMUNIZE NO ADMIN: HCPCS | Performed by: FAMILY MEDICINE

## 2022-01-10 PROCEDURE — 99212 OFFICE O/P EST SF 10 MIN: CPT | Performed by: FAMILY MEDICINE

## 2022-01-10 PROCEDURE — G8420 CALC BMI NORM PARAMETERS: HCPCS | Performed by: FAMILY MEDICINE

## 2022-01-10 PROCEDURE — G8427 DOCREV CUR MEDS BY ELIG CLIN: HCPCS | Performed by: FAMILY MEDICINE

## 2022-01-10 ASSESSMENT — ENCOUNTER SYMPTOMS
NAUSEA: 1
DIARRHEA: 0
SORE THROAT: 1
SHORTNESS OF BREATH: 0
ABDOMINAL PAIN: 0
VOMITING: 0
WHEEZING: 0
RHINORRHEA: 0
COUGH: 1

## 2022-01-10 ASSESSMENT — LIFESTYLE VARIABLES: HOW OFTEN DO YOU HAVE A DRINK CONTAINING ALCOHOL: NEVER

## 2022-01-10 NOTE — PROGRESS NOTES
[facility] MEDICINE RESIDENCY PROGRAM  DATE OF VISIT : 1/10/2022    Patient : Arik Gutierrez   Age : 19 y.o.  : 2000   MRN : <[id number]>   ______________________________________________________________________    Chief Complaint :   Chief Complaint   Patient presents with    Cough     covid exposure    Congestion       HPI : Arik Gutierrez is 19 y.o. female who presented to the clinic today for sick visit. Symptoms started Friday. Fevers, chills, congestion, rhinorrhea, ear pain/pressure, cough, sore throat, nausea and body aches. Boyfriend has confirmed exposure to COVID, boss who he travels with is covid positive, he also has similar symptoms to hers. Denies any chest pain, SOB, palpitations, diarrhea or  discomfort, loss of taste or smell. Has been using tylenol around the clock for fever and symptom control, taking it down to 98F and has not been >100F since she started the tylenol. Tried Theraflu at home and every 4-6 hours she uses a cold, cough and flu medication as well as an elderberry immune support syrup. Also using humidifier at home at night which helps with congestion. I reviewed the patient's past medications, allergies and past medical history during this visit. Past Medical History :        Past Medical History:   Diagnosis Date    ADHD     Anxiety     Depression     Scoliosis      History reviewed. No pertinent surgical history.     Social History :  Social History     Tobacco History     Smoking Status  Current Every Day Smoker Smoking Start Date  2014 Smoking Frequency  0.25 packs/day for 7 years (1.75 pk yrs) Smoking Tobacco Type  Cigarettes    Smokeless Tobacco Use  Never Used    Tobacco Comment  3-4 cigarettes          Alcohol History     Alcohol Use Status  No Comment  occasionally          Drug Use     Drug Use Status  Yes Types  Marijuana (Weed) Comment  occasional          Sexual Activity     Sexually Active  Yes Partners  Male Allergies :   No Known Allergies    Medication List :    Current Outpatient Medications   Medication Sig Dispense Refill    cetirizine (ZYRTEC) 10 MG tablet       naproxen (NAPROSYN) 500 MG tablet Take 1 tablet by mouth 2 times daily (with meals) 60 tablet 0     No current facility-administered medications for this visit. Review of Systems :  Review of Systems   Constitutional: Positive for chills, fatigue and fever. HENT: Positive for congestion and sore throat. Negative for rhinorrhea. Respiratory: Positive for cough. Negative for shortness of breath and wheezing. Cardiovascular: Negative for chest pain, palpitations and leg swelling. Gastrointestinal: Positive for nausea. Negative for abdominal pain, diarrhea and vomiting. Neurological: Positive for headaches. Negative for dizziness, weakness, light-headedness and numbness. ______________________________________________________________________    Physical Exam :    Vitals: /73 (Site: Right Upper Arm, Position: Sitting, Cuff Size: Medium Adult)   Pulse 70   Temp 99.1 °F (37.3 °C) (Temporal)   Resp 18   Ht 5' 4\" (1.626 m)   Wt 120 lb (54.4 kg)   LMP 12/10/2021   SpO2 97%   BMI 20.60 kg/m²   Physical Exam  Vitals reviewed. Constitutional:       General: She is not in acute distress. Appearance: Normal appearance. HENT:      Ears:      Comments: Bilateral serous effusion behind TM. Cardiovascular:      Rate and Rhythm: Normal rate and regular rhythm. Pulses: Normal pulses. Heart sounds: Normal heart sounds. No murmur heard. Pulmonary:      Effort: Pulmonary effort is normal. No respiratory distress. Breath sounds: Normal breath sounds. No wheezing. Abdominal:      General: Bowel sounds are normal. There is no distension. Palpations: Abdomen is soft. Tenderness: There is no abdominal tenderness. Musculoskeletal:      Right lower leg: No edema. Left lower leg: No edema. Neurological:      Mental Status: She is alert.         ___________________    Assessment & Plan :    1. Viral URI  2. Exposure to COVID-19 virus  - continue symptomatic treatment  - discussed concerning symptoms that would require further management or ED visit. - discussed quarantine time and how to avoid spread  - COVID-19 Ambulatory; Future      Educational materials and/or home exercises printed for patient's review and were included in patient instructions on his/her After Visit Summary and given to patient at the end of visit. Counseled regarding above diagnosis, including possible risks and complications,  especially if left uncontrolled. Counseled regarding the possible side effects, risks, benefits and alternatives to treatment; patient and/or guardian verbalizes understanding, agrees, feels comfortable with and wishes to proceed with above treatment plan. Advised patient to call with any new medication issues, and read all Rx info from pharmacy to assure aware of all possible risks and side effects of medication before taking. Reviewed age and gender appropriate health screening exams and vaccinations. Advised patient regarding importance of keeping up with recommended health maintenance and to schedule as soon as possible if overdue, as this is important in assessing for undiagnosed pathology, especially cancer, as well as protecting against potentially harmful/life threatening disease. Patient and/or guardian verbalizes understanding and agrees with above counseling, assessment and plan. All questions answered    Additional plan and future considerations:   RTO PRN with PCP    Return to Office: Return if symptoms worsen or fail to improve.     Laura Hill MD   Case discussed with Dr. Nishant Ashton

## 2022-01-10 NOTE — PROGRESS NOTES
S: 19 y.o. female presents today for Cough (covid exposure) and Congestion    Fever, chills, cough, congestion, ear pressure  Nausea, body aches, fatigue  covid exposure    O: VS: /73 (Site: Right Upper Arm, Position: Sitting, Cuff Size: Medium Adult)   Pulse 70   Temp 99.1 °F (37.3 °C) (Temporal)   Resp 18   Ht 5' 4\" (1.626 m)   Wt 120 lb (54.4 kg)   LMP 12/10/2021   SpO2 97%   BMI 20.60 kg/m²   AAO/NAD, appropriate affect for mood  ENT - slight serous effusion R>L  CV:  RRR, no murmur  Resp: CTAB  Abdomen: sntnd  Ext; no edema    Assessment/Plan:   1) suspected covid - pcr testing; continue symptomatic care  RTO: Return if symptoms worsen or fail to improve. Attending Physician Statement  I have discussed the case, including pertinent history and exam findings with the resident. I agree with the documented assessment and plan.       Electronically signed by Gretel Clinton MD on 1/10/2022 at 4:25 PM

## 2022-01-12 LAB
SARS-COV-2: NOT DETECTED
SOURCE: NORMAL

## 2022-01-22 DIAGNOSIS — N94.6 DYSMENORRHEA, UNSPECIFIED: ICD-10-CM

## 2022-01-24 RX ORDER — NAPROXEN 500 MG/1
TABLET ORAL
Qty: 60 TABLET | Refills: 0 | Status: SHIPPED | OUTPATIENT
Start: 2022-01-24

## 2022-02-09 ENCOUNTER — OFFICE VISIT (OUTPATIENT)
Dept: FAMILY MEDICINE CLINIC | Age: 22
End: 2022-02-09
Payer: COMMERCIAL

## 2022-02-09 VITALS
BODY MASS INDEX: 20.49 KG/M2 | RESPIRATION RATE: 16 BRPM | DIASTOLIC BLOOD PRESSURE: 52 MMHG | OXYGEN SATURATION: 98 % | WEIGHT: 120 LBS | TEMPERATURE: 98.5 F | HEART RATE: 60 BPM | SYSTOLIC BLOOD PRESSURE: 98 MMHG | HEIGHT: 64 IN

## 2022-02-09 DIAGNOSIS — R68.89 FLU-LIKE SYMPTOMS: ICD-10-CM

## 2022-02-09 DIAGNOSIS — J06.9 VIRAL URI: Primary | ICD-10-CM

## 2022-02-09 PROCEDURE — G8420 CALC BMI NORM PARAMETERS: HCPCS | Performed by: STUDENT IN AN ORGANIZED HEALTH CARE EDUCATION/TRAINING PROGRAM

## 2022-02-09 PROCEDURE — 1036F TOBACCO NON-USER: CPT | Performed by: STUDENT IN AN ORGANIZED HEALTH CARE EDUCATION/TRAINING PROGRAM

## 2022-02-09 PROCEDURE — G8484 FLU IMMUNIZE NO ADMIN: HCPCS | Performed by: STUDENT IN AN ORGANIZED HEALTH CARE EDUCATION/TRAINING PROGRAM

## 2022-02-09 PROCEDURE — 99213 OFFICE O/P EST LOW 20 MIN: CPT | Performed by: STUDENT IN AN ORGANIZED HEALTH CARE EDUCATION/TRAINING PROGRAM

## 2022-02-09 PROCEDURE — G8427 DOCREV CUR MEDS BY ELIG CLIN: HCPCS | Performed by: STUDENT IN AN ORGANIZED HEALTH CARE EDUCATION/TRAINING PROGRAM

## 2022-02-09 PROCEDURE — 99212 OFFICE O/P EST SF 10 MIN: CPT | Performed by: STUDENT IN AN ORGANIZED HEALTH CARE EDUCATION/TRAINING PROGRAM

## 2022-02-09 RX ORDER — CYCLOBENZAPRINE HCL 5 MG
5 TABLET ORAL 3 TIMES DAILY PRN
COMMUNITY
End: 2022-03-10 | Stop reason: SDUPTHER

## 2022-02-09 ASSESSMENT — ENCOUNTER SYMPTOMS
COUGH: 1
SINUS PRESSURE: 0
SHORTNESS OF BREATH: 0
VOMITING: 0
SORE THROAT: 0
NAUSEA: 1

## 2022-02-09 NOTE — PROGRESS NOTES
S: 19 y.o. female with fevers for past 3 days, highest 101.3, better with Tylenol. Cough, dry. No nasal congestion. Nausea, chills. No emesis. Staying hydrated. No loss taste/smell. FDLMP about 28 days ago, due soon. No other symptoms. No known sick contacts, works at [employer]. Fevers have resolved. O: VS: BP (!) 98/52 (Site: Right Upper Arm, Position: Sitting, Cuff Size: Medium Adult)   Pulse 60   Temp 98.5 °F (36.9 °C) (Oral)   Resp 16   Ht 5' 4\" (1.626 m)   Wt 120 lb (54.4 kg)   LMP 01/13/2022   SpO2 98%   BMI 20.60 kg/m²    General: NAD, appropriate affect and grooming, appears nontoxic    HEENT:  No sinus tenderness. Throat clear, no erythema or exudate. CV:  RRR, no gallops, rubs, or murmurs   Resp: CTAB   Abd:  Soft, nontender  Impression: Upper respiratory infection/viral syndrome   Plan: COVID test to be sent; offered flu test but declined. Isolate at home until results. Hydration. Stay active at home. RTO prn or for well visits. Seek care if menstrual period does not happen on time. Attending Physician Statement  I have discussed the case, including pertinent history and exam findings with the resident. I agree with the documented assessment and plan.

## 2022-02-09 NOTE — PROGRESS NOTES
[facility] MEDICINE RESIDENCY PROGRAM  DATE OF VISIT : 2022    Patient : Jarod De Leon   Age : 19 y.o.  : 2000   MRN : [id number]   ______________________________________________________________________    Chief Complaint :   Chief Complaint   Patient presents with    Fever    Nausea       HPI : Jarod De Leon is 19 y.o. female who presented to the clinic today for fever and chills    Flu like symptoms  -reports fever for the past 3 days. Highest temperature 101.3   - has decreased with tylenol  - associated symptoms nausea, dry cough, chills, and HA   - denies vomiting, recent sick contacts, nasal congestion or sinus tenderness. Denies sputum production, Loss of taste or smell.   - works in [employer].   - would like to get covid tested for work. Past Medical History :  Past Medical History:   Diagnosis Date    ADHD     Anxiety     Depression     Scoliosis      No past surgical history on file. Allergies :   No Known Allergies    Medication List :    Current Outpatient Medications   Medication Sig Dispense Refill    cyclobenzaprine (FLEXERIL) 5 MG tablet Take 5 mg by mouth 3 times daily as needed for Muscle spasms      naproxen (NAPROSYN) 500 MG tablet TAKE 1 TABLET BY MOUTH TWICE A DAY WITH MEALS 60 tablet 0     No current facility-administered medications for this visit. Family History :    Family History   Problem Relation Age of Onset    Cancer Maternal Grandfather     Cancer Paternal Grandmother        Surgical History :   No past surgical history on file.     Social History :   Social History     Tobacco Use    Smoking status: Former Smoker     Years: 7.00     Types: Cigarettes     Start date:     Smokeless tobacco: Never Used    Tobacco comment: 3-4 cigarettes   Vaping Use    Vaping Use: Never used   Substance Use Topics    Alcohol use: No     Comment: occasionally    Drug use: Yes     Types: Marijuana Bren Alba     Comment: occasional Review of Systems :  Review of Systems   Constitutional: Positive for chills. HENT: Negative for congestion, sinus pressure and sore throat. Respiratory: Positive for cough. Negative for shortness of breath. Cardiovascular: Negative. Gastrointestinal: Positive for nausea. Negative for vomiting. Neurological: Positive for headaches.     ______________________________________________________________________    Physical Exam :    Vitals: BP (!) 98/52 (Site: Right Upper Arm, Position: Sitting, Cuff Size: Medium Adult)   Pulse 60   Temp 98.5 °F (36.9 °C) (Oral)   Resp 16   Ht 5' 4\" (1.626 m)   Wt 120 lb (54.4 kg)   LMP 01/13/2022   SpO2 98%   BMI 20.60 kg/m²   General Appearance: Well developed, awake, alert, oriented, and in NAD  HEENT: NCAT, MMM, no cobblestoning, postnasal drip, no exudate, no sinus tenderness. Neck: Supple, symmetrical, trachea midline. No JVD or carotid bruits. No lymphadenopathy. Chest wall/Lung: CTAB, respirations unlabored. No ronchi/wheezing/rales   Heart[de-identified] RRR, normal S1 and S2, no murmurs, rubs or gallops. Abdomen: SNTND, +BSx4  Skin: Skin color, texture, turgor normal, no rashes or lesions  Lymph nodes: No lymph node enlargement appreciated  ___________________________________________________________________    Assessment & Plan :    1. Flu-like symptoms (COVID vs influenza vs other URI)  - no signs or symptoms of bacterial URI or PNA  - will test for  COVID-19 Ambulatory  - recommend symptomatic treatment. - continue to stay well hydrate and isolate until test results return.      - CLEO Lewis MD no

## 2022-02-09 NOTE — LETTER
NOTIFICATION RETURN TO WORK / SCHOOL    2/9/2022    Ms. GONSALO CORDOVA [facility]  [address]      To Whom It May Concern:    GONSALO CORDOVA [facility] was tested for COVID-19 on 2/9, and the result are pending    She may return to work if covid test is negative. if postive patient is advised to isolate for 5 days. .  I recommend:return without restrictions once test results return     If there are questions or concerns, please have the patient contact our office.         Sincerely,      Devere Castleman, MD

## 2022-02-11 LAB — SARS-COV-2, PCR: NOT DETECTED

## 2022-03-10 ENCOUNTER — OFFICE VISIT (OUTPATIENT)
Dept: FAMILY MEDICINE CLINIC | Age: 22
End: 2022-03-10
Payer: COMMERCIAL

## 2022-03-10 VITALS
OXYGEN SATURATION: 95 % | RESPIRATION RATE: 18 BRPM | HEIGHT: 64 IN | BODY MASS INDEX: 20.32 KG/M2 | DIASTOLIC BLOOD PRESSURE: 55 MMHG | WEIGHT: 119 LBS | SYSTOLIC BLOOD PRESSURE: 108 MMHG | HEART RATE: 76 BPM | TEMPERATURE: 99 F

## 2022-03-10 DIAGNOSIS — F41.9 ANXIETY: Primary | ICD-10-CM

## 2022-03-10 DIAGNOSIS — M54.2 NECK PAIN: ICD-10-CM

## 2022-03-10 DIAGNOSIS — Z11.8 ENCOUNTER FOR SCREENING EXAMINATION FOR CHLAMYDIAL INFECTION: ICD-10-CM

## 2022-03-10 PROCEDURE — G8420 CALC BMI NORM PARAMETERS: HCPCS | Performed by: FAMILY MEDICINE

## 2022-03-10 PROCEDURE — 99213 OFFICE O/P EST LOW 20 MIN: CPT | Performed by: FAMILY MEDICINE

## 2022-03-10 PROCEDURE — 99212 OFFICE O/P EST SF 10 MIN: CPT | Performed by: FAMILY MEDICINE

## 2022-03-10 PROCEDURE — G8484 FLU IMMUNIZE NO ADMIN: HCPCS | Performed by: FAMILY MEDICINE

## 2022-03-10 PROCEDURE — 1036F TOBACCO NON-USER: CPT | Performed by: FAMILY MEDICINE

## 2022-03-10 PROCEDURE — G8427 DOCREV CUR MEDS BY ELIG CLIN: HCPCS | Performed by: FAMILY MEDICINE

## 2022-03-10 RX ORDER — CYCLOBENZAPRINE HCL 5 MG
5 TABLET ORAL 3 TIMES DAILY PRN
Qty: 30 TABLET | Refills: 0 | Status: SHIPPED | OUTPATIENT
Start: 2022-03-10 | End: 2022-03-20

## 2022-03-10 SDOH — ECONOMIC STABILITY: FOOD INSECURITY: WITHIN THE PAST 12 MONTHS, THE FOOD YOU BOUGHT JUST DIDN'T LAST AND YOU DIDN'T HAVE MONEY TO GET MORE.: SOMETIMES TRUE

## 2022-03-10 SDOH — ECONOMIC STABILITY: FOOD INSECURITY: WITHIN THE PAST 12 MONTHS, YOU WORRIED THAT YOUR FOOD WOULD RUN OUT BEFORE YOU GOT MONEY TO BUY MORE.: SOMETIMES TRUE

## 2022-03-10 ASSESSMENT — SOCIAL DETERMINANTS OF HEALTH (SDOH): HOW HARD IS IT FOR YOU TO PAY FOR THE VERY BASICS LIKE FOOD, HOUSING, MEDICAL CARE, AND HEATING?: NOT VERY HARD

## 2022-03-10 ASSESSMENT — PATIENT HEALTH QUESTIONNAIRE - PHQ9
SUM OF ALL RESPONSES TO PHQ9 QUESTIONS 1 & 2: 2
6. FEELING BAD ABOUT YOURSELF - OR THAT YOU ARE A FAILURE OR HAVE LET YOURSELF OR YOUR FAMILY DOWN: 0
1. LITTLE INTEREST OR PLEASURE IN DOING THINGS: 1
SUM OF ALL RESPONSES TO PHQ QUESTIONS 1-9: 5
8. MOVING OR SPEAKING SO SLOWLY THAT OTHER PEOPLE COULD HAVE NOTICED. OR THE OPPOSITE, BEING SO FIGETY OR RESTLESS THAT YOU HAVE BEEN MOVING AROUND A LOT MORE THAN USUAL: 0
2. FEELING DOWN, DEPRESSED OR HOPELESS: 1
SUM OF ALL RESPONSES TO PHQ QUESTIONS 1-9: 2
SUM OF ALL RESPONSES TO PHQ QUESTIONS 1-9: 5
1. LITTLE INTEREST OR PLEASURE IN DOING THINGS: 1
9. THOUGHTS THAT YOU WOULD BE BETTER OFF DEAD, OR OF HURTING YOURSELF: 0
SUM OF ALL RESPONSES TO PHQ QUESTIONS 1-9: 5
SUM OF ALL RESPONSES TO PHQ QUESTIONS 1-9: 2
SUM OF ALL RESPONSES TO PHQ9 QUESTIONS 1 & 2: 2
7. TROUBLE CONCENTRATING ON THINGS, SUCH AS READING THE NEWSPAPER OR WATCHING TELEVISION: 1
3. TROUBLE FALLING OR STAYING ASLEEP: 0
2. FEELING DOWN, DEPRESSED OR HOPELESS: 1
5. POOR APPETITE OR OVEREATING: 1
4. FEELING TIRED OR HAVING LITTLE ENERGY: 1
SUM OF ALL RESPONSES TO PHQ QUESTIONS 1-9: 2
SUM OF ALL RESPONSES TO PHQ QUESTIONS 1-9: 5
SUM OF ALL RESPONSES TO PHQ QUESTIONS 1-9: 2
10. IF YOU CHECKED OFF ANY PROBLEMS, HOW DIFFICULT HAVE THESE PROBLEMS MADE IT FOR YOU TO DO YOUR WORK, TAKE CARE OF THINGS AT HOME, OR GET ALONG WITH OTHER PEOPLE: 1

## 2022-03-10 NOTE — PROGRESS NOTES
S: Harpreet Garcia 19 y.o. female  here for checkup. Past medical history remarkable for eating disorder and low back pain. Low back pain: Currently stable and well controlled with home-based exercises  Anxiety disorder: Mostly situational; when she moved out of her nuclear family's home and into her own home (completely 2 dogs), as well as cutting back on work, her mood markedly improved. She is currently on no medication. O: VS: BP (!) 108/55 (Site: Right Upper Arm, Position: Sitting, Cuff Size: Medium Adult)   Pulse 76   Temp 99 °F (37.2 °C) (Temporal)   Resp 18   Ht 5' 4\" (1.626 m)   Wt 119 lb (54 kg)   LMP 02/13/2022   SpO2 95%   BMI 20.43 kg/m²    General: NAD              HEENT: WDL              Neck: WDL   CV:  RRR, no gallops, rubs, or murmurs   Resp: CTAB no R/R/W      Assessment / Plan:      Alexandra Mcmillan was seen today for back pain. Diagnoses and all orders for this visit:      1. Anxiety  Patient continues to want to monitor off of medication   Is doing well at this time  We will follow according     2. Neck pain  Paraspinal tenderness  We will refill Flexeril  - cyclobenzaprine (FLEXERIL) 5 MG tablet; Take 1 tablet by mouth 3 times daily as needed for Muscle spasms  Dispense: 30 tablet; Refill: 0     3. Encounter for screening examination for chlamydial infection  Patient due at this time for a chlamydia screen, amenable  - C.TRACHOMATIS Araseli Quinteros; Future     RTO 3 months or sooner prn for any persistent, new, or worsening symptoms. Assessment/Plan:  1. Anxiety disorder: Stable and well controlled off medications  2. Low back pain: Stable and well controlled at this time  3. Health maintenance: Depression screening complete. Screening for chlamydia to be done today. Return in about 3 months (around 6/10/2022) for Follow up. Follow-up for routine checkup 1 to 3 months.       Attending Physician Statement  I have discussed the case, including pertinent history and exam findings with the resident. I agree with the documented assessment and plan.          Alison Brown MD

## 2022-03-10 NOTE — PROGRESS NOTES
CC: Follow-up anxiety and neck pain    HPI:  19 y.o. female with PMH of anxiety, depression, low back pain who presents for follow-up on anxiety, complaints of neck pain. Neck Pain  Patient states that she has not changed her exercise routine or had any recent trauma to the neck. States that on and off she does get this neck pain and soreness with her history of possible scoliosis. Notes that the neck her neck is located to the right side of the spine. Would like a refill on Flexeril    Anxiety  States that she is doing well with her anxiety  Still not on any medications  Feels that ever since she has moved out of her family's home, her anxiety levels has gone down  She is working part-time [employer], doing well with that  At home with 2 dogs which she feels is very therapeutic  Would like to own a farm and states she will try to work towards that  No other concerns at this time    ROS:    Pertinent as above. Patient denies any dizziness, nausea, vomiting, headaches, numbness tingling, chest pain, palpitations, shortness of breath, wheezing, abdominal pain, dysuria, increased urinary frequency, rashes, lightheadedness, numbness and tingling. Objective:    VS:  Blood pressure (!) 108/55, pulse 76, temperature 99 °F (37.2 °C), temperature source Temporal, resp. rate 18, height 5' 4\" (1.626 m), weight 119 lb (54 kg), last menstrual period 02/13/2022, SpO2 95 %, not currently breastfeeding. Physical Exam  Constitutional:       Appearance: Normal appearance. She is not ill-appearing. Neck:      Vascular: No carotid bruit. Comments: Right paraspinal tenderness  Cardiovascular:      Rate and Rhythm: Normal rate and regular rhythm. Pulses: Normal pulses. Heart sounds: Normal heart sounds. No murmur heard. No gallop. Pulmonary:      Effort: Pulmonary effort is normal. No respiratory distress. Breath sounds: Normal breath sounds. No wheezing.    Musculoskeletal: Cervical back: Normal range of motion and neck supple. Tenderness present. No rigidity. Lymphadenopathy:      Cervical: No cervical adenopathy. Neurological:      General: No focal deficit present. Mental Status: She is alert and oriented to person, place, and time. Comments: Strength 5/5 bilateral upper extremities. Psychiatric:         Mood and Affect: Mood normal.         Behavior: Behavior normal.         Thought Content: Thought content normal.         Judgment: Judgment normal.       Assessment/Plan:    1. Anxiety  Patient continues to want to monitor off of medication   Is doing well at this time  We will follow according    2. Neck pain  Paraspinal tenderness  We will refill Flexeril  - cyclobenzaprine (FLEXERIL) 5 MG tablet; Take 1 tablet by mouth 3 times daily as needed for Muscle spasms  Dispense: 30 tablet; Refill: 0    3. Encounter for screening examination for chlamydial infection  Patient due at this time for a chlamydia screen, amenable  - C.TRACHOMATIS Kelsi Watson; Future     RTO 3 months or sooner prn for any persistent, new, or worsening symptoms. Please see Patient Instructions for further counseling and information given. Advised to please be adherent to the treatment plans discussed today, and please call with any questions or concerns, letting the office know of any reasons that the plans may not be followed. The risks of untreated conditions include worsening illness, injury, disability, and possibly, death. Please call if symptoms change in any way, worsen, or fail to completely resolve, as this could necessitate a change to treatment plans. Patient and/or caregiver expressed understanding. Indications and proper use of medication(s) reviewed. Potential side-effects and risks of medication(s) also explained. Patient and/or caregiver was instructed to call if any new symptoms develop prior to next visit.     Health risk factors discussed and addressed.      Electronically signed by Vaughn William MD PGY-2 on 3/10/2022 at 11:24 AM  This case was discussed with attending physician: Dr. Jesus Kendall

## 2022-05-12 ENCOUNTER — HOSPITAL ENCOUNTER (EMERGENCY)
Age: 22
Discharge: HOME | End: 2022-05-12
Payer: COMMERCIAL

## 2022-05-12 VITALS
HEIGHT: 64 IN | TEMPERATURE: 99.9 F | OXYGEN SATURATION: 97 % | DIASTOLIC BLOOD PRESSURE: 62 MMHG | SYSTOLIC BLOOD PRESSURE: 107 MMHG | BODY MASS INDEX: 20.49 KG/M2 | RESPIRATION RATE: 18 BRPM | WEIGHT: 120 LBS | HEART RATE: 108 BPM

## 2022-05-12 DIAGNOSIS — R19.7 NAUSEA VOMITING AND DIARRHEA: Primary | ICD-10-CM

## 2022-05-12 DIAGNOSIS — R11.2 NAUSEA VOMITING AND DIARRHEA: Primary | ICD-10-CM

## 2022-05-12 DIAGNOSIS — B34.9 VIRAL ILLNESS: ICD-10-CM

## 2022-05-12 LAB
ALBUMIN SERPL-MCNC: 4.6 G/DL (ref 3.5–5.2)
ALP BLD-CCNC: 50 U/L (ref 35–104)
ALT SERPL-CCNC: 18 U/L (ref 0–32)
ANION GAP SERPL CALCULATED.3IONS-SCNC: 14 MMOL/L (ref 7–16)
AST SERPL-CCNC: 19 U/L (ref 0–31)
BACTERIA: NORMAL /HPF
BASOPHILS ABSOLUTE: 0 E9/L (ref 0–0.2)
BASOPHILS RELATIVE PERCENT: 0.3 % (ref 0–2)
BILIRUB SERPL-MCNC: 0.3 MG/DL (ref 0–1.2)
BILIRUBIN URINE: NEGATIVE
BLOOD, URINE: ABNORMAL
BUN BLDV-MCNC: 8 MG/DL (ref 6–20)
BURR CELLS: ABNORMAL
CALCIUM SERPL-MCNC: 9.6 MG/DL (ref 8.6–10.2)
CHLORIDE BLD-SCNC: 100 MMOL/L (ref 98–107)
CLARITY: CLEAR
CO2: 20 MMOL/L (ref 22–29)
COLOR: YELLOW
CREAT SERPL-MCNC: 0.6 MG/DL (ref 0.5–1)
EOSINOPHILS ABSOLUTE: 0 E9/L (ref 0.05–0.5)
EOSINOPHILS RELATIVE PERCENT: 0.3 % (ref 0–6)
GFR AFRICAN AMERICAN: >60
GFR NON-AFRICAN AMERICAN: >60 ML/MIN/1.73
GLUCOSE BLD-MCNC: 98 MG/DL (ref 74–99)
GLUCOSE URINE: NEGATIVE MG/DL
HCG, URINE, POC: NEGATIVE
HCT VFR BLD CALC: 37.8 % (ref 34–48)
HEMOGLOBIN: 12.9 G/DL (ref 11.5–15.5)
INFLUENZA A BY PCR: NOT DETECTED
INFLUENZA B BY PCR: NOT DETECTED
KETONES, URINE: 15 MG/DL
LACTIC ACID: 1.3 MMOL/L (ref 0.5–2.2)
LEUKOCYTE ESTERASE, URINE: ABNORMAL
LIPASE: 23 U/L (ref 13–60)
LYMPHOCYTES ABSOLUTE: 0.29 E9/L (ref 1.5–4)
LYMPHOCYTES RELATIVE PERCENT: 7.8 % (ref 20–42)
Lab: NORMAL
MAGNESIUM: 1.7 MG/DL (ref 1.6–2.6)
MCH RBC QN AUTO: 29.6 PG (ref 26–35)
MCHC RBC AUTO-ENTMCNC: 34.1 % (ref 32–34.5)
MCV RBC AUTO: 86.7 FL (ref 80–99.9)
MONOCYTES ABSOLUTE: 0.32 E9/L (ref 0.1–0.95)
MONOCYTES RELATIVE PERCENT: 8.7 % (ref 2–12)
NEGATIVE QC PASS/FAIL: NORMAL
NEUTROPHILS ABSOLUTE: 3.02 E9/L (ref 1.8–7.3)
NEUTROPHILS RELATIVE PERCENT: 83.5 % (ref 43–80)
NITRITE, URINE: NEGATIVE
OVALOCYTES: ABNORMAL
PDW BLD-RTO: 12.7 FL (ref 11.5–15)
PH UA: 6 (ref 5–9)
PLATELET # BLD: 141 E9/L (ref 130–450)
PMV BLD AUTO: 9.9 FL (ref 7–12)
POIKILOCYTES: ABNORMAL
POSITIVE QC PASS/FAIL: NORMAL
POTASSIUM SERPL-SCNC: 3.4 MMOL/L (ref 3.5–5)
PROTEIN UA: NEGATIVE MG/DL
RBC # BLD: 4.36 E12/L (ref 3.5–5.5)
RBC UA: NORMAL /HPF (ref 0–2)
SODIUM BLD-SCNC: 134 MMOL/L (ref 132–146)
SPECIFIC GRAVITY UA: 1.01 (ref 1–1.03)
TEAR DROP CELLS: ABNORMAL
TOTAL PROTEIN: 7.6 G/DL (ref 6.4–8.3)
UROBILINOGEN, URINE: 0.2 E.U./DL
WBC # BLD: 3.6 E9/L (ref 4.5–11.5)
WBC UA: NORMAL /HPF (ref 0–5)

## 2022-05-12 PROCEDURE — 83690 ASSAY OF LIPASE: CPT

## 2022-05-12 PROCEDURE — 85025 COMPLETE CBC W/AUTO DIFF WBC: CPT

## 2022-05-12 PROCEDURE — 81001 URINALYSIS AUTO W/SCOPE: CPT

## 2022-05-12 PROCEDURE — 99284 EMERGENCY DEPT VISIT MOD MDM: CPT

## 2022-05-12 PROCEDURE — 6370000000 HC RX 637 (ALT 250 FOR IP): Performed by: NURSE PRACTITIONER

## 2022-05-12 PROCEDURE — 80053 COMPREHEN METABOLIC PANEL: CPT

## 2022-05-12 PROCEDURE — 83605 ASSAY OF LACTIC ACID: CPT

## 2022-05-12 PROCEDURE — 87502 INFLUENZA DNA AMP PROBE: CPT

## 2022-05-12 PROCEDURE — 2580000003 HC RX 258: Performed by: NURSE PRACTITIONER

## 2022-05-12 PROCEDURE — 83735 ASSAY OF MAGNESIUM: CPT

## 2022-05-12 RX ORDER — 0.9 % SODIUM CHLORIDE 0.9 %
1000 INTRAVENOUS SOLUTION INTRAVENOUS ONCE
Status: COMPLETED | OUTPATIENT
Start: 2022-05-12 | End: 2022-05-12

## 2022-05-12 RX ORDER — ACETAMINOPHEN 500 MG
1000 TABLET ORAL ONCE
Status: COMPLETED | OUTPATIENT
Start: 2022-05-12 | End: 2022-05-12

## 2022-05-12 RX ORDER — POTASSIUM CHLORIDE 20 MEQ/1
40 TABLET, EXTENDED RELEASE ORAL ONCE
Status: COMPLETED | OUTPATIENT
Start: 2022-05-12 | End: 2022-05-12

## 2022-05-12 RX ORDER — ONDANSETRON 4 MG/1
4 TABLET, ORALLY DISINTEGRATING ORAL EVERY 8 HOURS PRN
Qty: 10 TABLET | Refills: 0 | Status: SHIPPED | OUTPATIENT
Start: 2022-05-12

## 2022-05-12 RX ADMIN — SODIUM CHLORIDE 1000 ML: 9 INJECTION, SOLUTION INTRAVENOUS at 18:24

## 2022-05-12 RX ADMIN — ACETAMINOPHEN 1000 MG: 500 TABLET ORAL at 18:22

## 2022-05-12 RX ADMIN — POTASSIUM CHLORIDE 40 MEQ: 1500 TABLET, EXTENDED RELEASE ORAL at 20:20

## 2022-05-12 ASSESSMENT — PAIN DESCRIPTION - ORIENTATION: ORIENTATION: LOWER

## 2022-05-12 ASSESSMENT — PAIN DESCRIPTION - LOCATION: LOCATION: BACK;HIP

## 2022-05-12 ASSESSMENT — PAIN SCALES - GENERAL
PAINLEVEL_OUTOF10: 0
PAINLEVEL_OUTOF10: 4

## 2022-05-12 ASSESSMENT — PAIN - FUNCTIONAL ASSESSMENT: PAIN_FUNCTIONAL_ASSESSMENT: 0-10

## 2022-05-12 NOTE — ED NOTES
Department of Emergency Medicine  FIRST PROVIDER TRIAGE NOTE             Independent MLP           5/12/22  3:58 PM EDT    Date of Encounter: 5/12/22   MRN: [id number]      HPI: Edward Moralez is a 19 y.o. female who presents to the ED for Emesis (started about 48 hours ago, about 2 hours after eating [facility] ([facility]) food), Fever, Muscle Pain, and Diarrhea  Complaints of nausea, vomiting, diarrhea with lower abdominal pain generalized weakness, fever and fatigue for the last 48 hours. States she believes she may have food poisoning. Denies any known exposure to ill contacts or anyone with similar symptoms. ROS: Negative for cp or sob. PE: Gen Appearance/Constitutional: alert  CV: regular rate     Initial Plan of Care: All treatment areas with department are currently occupied. Plan to order/Initiate the following while awaiting opening in ED: labs and imaging studies.   Initiate Treatment-Testing, Proceed toTreatment Area When Bed Available for ED Attending/MLP to Continue Care    Electronically signed by PILI Ordaz CNP   DD: 5/12/22         PILI Ba CNP  05/12/22 1558

## 2022-05-12 NOTE — Clinical Note
Suze Colby was seen and treated in our emergency department on 5/12/2022. She may return to work on 05/16/2022. If you have any questions or concerns, please don't hesitate to call.       Denice Maynard, PILI - CNP

## 2022-05-13 NOTE — ED PROVIDER NOTES
Independent MLP    HPI:  5/12/22,   Time: 11:23 PM EDT         Josseline Andrea is a 19 y.o. female presenting to the ED for nausea, vomiting and diarrhea concern for food poisoning, beginning 2 days ago. The complaint has been intermittent, mild in severity, and worsened by nothing. Presents for complaints of nausea, vomiting and diarrhea and concern for food poisoning which began 2 days ago after eating [employer]. Complaining of some generalized myalgia. She denies any fever. She states no other friends or family members were ill with any similar symptoms. ROS:   Pertinent positives and negatives are stated within HPI, all other systems reviewed and are negative.  --------------------------------------------- PAST HISTORY ---------------------------------------------  Past Medical History:  has a past medical history of ADHD, Anxiety, Depression, and Scoliosis. Past Surgical History:  has no past surgical history on file. Social History:  reports that she has been smoking cigarettes. She started smoking about 8 years ago. She has a 1.75 pack-year smoking history. She has never used smokeless tobacco. She reports current alcohol use. She reports current drug use. Drug: Marijuana Eron [place]). Family History: family history includes Cancer in her maternal grandfather and paternal grandmother. The patients home medications have been reviewed. Allergies: Patient has no known allergies.     -------------------------------------------------- RESULTS -------------------------------------------------  All laboratory and radiology results have been personally reviewed by myself   LABS:  Results for orders placed or performed during the hospital encounter of 05/12/22   Rapid influenza A/B antigens    Specimen: Nasopharyngeal   Result Value Ref Range    Influenza A by PCR Not Detected Not Detected    Influenza B by PCR Not Detected Not Detected   CBC with Auto Differential   Result Value Ref Range    WBC 3.6 (L) 4.5 - 11.5 E9/L    RBC 4.36 3.50 - 5.50 E12/L    Hemoglobin 12.9 11.5 - 15.5 g/dL    Hematocrit 37.8 34.0 - 48.0 %    MCV 86.7 80.0 - 99.9 fL    MCH 29.6 26.0 - 35.0 pg    MCHC 34.1 32.0 - 34.5 %    RDW 12.7 11.5 - 15.0 fL    Platelets 641 558 - 324 E9/L    MPV 9.9 7.0 - 12.0 fL    Neutrophils % 83.5 (H) 43.0 - 80.0 %    Lymphocytes % 7.8 (L) 20.0 - 42.0 %    Monocytes % 8.7 2.0 - 12.0 %    Eosinophils % 0.3 0.0 - 6.0 %    Basophils % 0.3 0.0 - 2.0 %    Neutrophils Absolute 3.02 1.80 - 7.30 E9/L    Lymphocytes Absolute 0.29 (L) 1.50 - 4.00 E9/L    Monocytes Absolute 0.32 0.10 - 0.95 E9/L    Eosinophils Absolute 0.00 (L) 0.05 - 0.50 E9/L    Basophils Absolute 0.00 0.00 - 0.20 E9/L    Poikilocytes 1+     Sinclair Cells 1+     Ovalocytes 1+     Tear Drop Cells 1+    Comprehensive Metabolic Panel   Result Value Ref Range    Sodium 134 132 - 146 mmol/L    Potassium 3.4 (L) 3.5 - 5.0 mmol/L    Chloride 100 98 - 107 mmol/L    CO2 20 (L) 22 - 29 mmol/L    Anion Gap 14 7 - 16 mmol/L    Glucose 98 74 - 99 mg/dL    BUN 8 6 - 20 mg/dL    CREATININE 0.6 0.5 - 1.0 mg/dL    GFR Non-African American >60 >=60 mL/min/1.73    GFR African American >60     Calcium 9.6 8.6 - 10.2 mg/dL    Total Protein 7.6 6.4 - 8.3 g/dL    Albumin 4.6 3.5 - 5.2 g/dL    Total Bilirubin 0.3 0.0 - 1.2 mg/dL    Alkaline Phosphatase 50 35 - 104 U/L    ALT 18 0 - 32 U/L    AST 19 0 - 31 U/L   Urinalysis   Result Value Ref Range    Color, UA Yellow Straw/Yellow    Clarity, UA Clear Clear    Glucose, Ur Negative Negative mg/dL    Bilirubin Urine Negative Negative    Ketones, Urine 15 (A) Negative mg/dL    Specific Gravity, UA 1.010 1.005 - 1.030    Blood, Urine LARGE (A) Negative    pH, UA 6.0 5.0 - 9.0    Protein, UA Negative Negative mg/dL    Urobilinogen, Urine 0.2 <2.0 E.U./dL    Nitrite, Urine Negative Negative    Leukocyte Esterase, Urine SMALL (A) Negative   Magnesium   Result Value Ref Range    Magnesium 1.7 1.6 - 2.6 mg/dL   Lactic Acid   Result Medical Decision Making:    States he feels improved after IV fluids were given. Influenza is negative labs are otherwise normal potassium was mildly low and was replaced. Likely is a viral illness and will be treated accordingly encouraged increase oral fluids and follow-up with PCP if any change or worsening symptoms to return back to the emergency room. Counseling: The emergency provider has spoken with the patient and discussed todays results, in addition to providing specific details for the plan of care and counseling regarding the diagnosis and prognosis. Questions are answered at this time and they are agreeable with the plan.      --------------------------------- IMPRESSION AND DISPOSITION ---------------------------------    IMPRESSION  1. Nausea vomiting and diarrhea    2.  Viral illness        DISPOSITION  Disposition: Discharge to home  Patient condition is good                 Pepe Coles, PILI - CNP  05/12/22 3907

## 2024-01-12 ENCOUNTER — APPOINTMENT (OUTPATIENT)
Dept: GENERAL RADIOLOGY | Age: 24
End: 2024-01-12
Payer: OTHER MISCELLANEOUS

## 2024-01-12 ENCOUNTER — HOSPITAL ENCOUNTER (EMERGENCY)
Age: 24
Discharge: HOME | End: 2024-01-12
Payer: OTHER MISCELLANEOUS

## 2024-01-12 VITALS
OXYGEN SATURATION: 100 % | RESPIRATION RATE: 20 BRPM | HEART RATE: 76 BPM | WEIGHT: 125 LBS | SYSTOLIC BLOOD PRESSURE: 117 MMHG | DIASTOLIC BLOOD PRESSURE: 69 MMHG | TEMPERATURE: 98.3 F | BODY MASS INDEX: 21.46 KG/M2
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
## 2024-01-12 DIAGNOSIS — S52.615A CLOSED NONDISPLACED FRACTURE OF STYLOID PROCESS OF LEFT ULNA, INITIAL ENCOUNTER: ICD-10-CM

## 2024-01-12 DIAGNOSIS — S63.502A LEFT WRIST SPRAIN, INITIAL ENCOUNTER: ICD-10-CM

## 2024-01-12 PROCEDURE — 73110 X-RAY EXAM OF WRIST: CPT

## 2024-01-12 PROCEDURE — 6370000000 HC RX 637 (ALT 250 FOR IP): Performed by: PHYSICIAN ASSISTANT

## 2024-01-12 PROCEDURE — 99283 EMERGENCY DEPT VISIT LOW MDM: CPT

## 2024-01-12 RX ORDER — IBUPROFEN 600 MG/1
600 TABLET ORAL ONCE
Status: COMPLETED | OUTPATIENT
Start: 2024-01-12 | End: 2024-01-12

## 2024-01-12 RX ADMIN — IBUPROFEN 600 MG: 600 TABLET, FILM COATED ORAL at 17:42

## 2024-01-12 ASSESSMENT — LIFESTYLE VARIABLES
HOW OFTEN DO YOU HAVE A DRINK CONTAINING ALCOHOL: NEVER
HOW OFTEN DO YOU HAVE A DRINK CONTAINING ALCOHOL: NEVER

## 2024-01-12 ASSESSMENT — PAIN DESCRIPTION - LOCATION: LOCATION: WRIST

## 2024-01-12 ASSESSMENT — PAIN DESCRIPTION - ORIENTATION: ORIENTATION: LEFT

## 2024-01-12 ASSESSMENT — PAIN SCALES - GENERAL: PAINLEVEL_OUTOF10: 7
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
